# Patient Record
Sex: FEMALE | Race: WHITE | Employment: FULL TIME | URBAN - METROPOLITAN AREA
[De-identification: names, ages, dates, MRNs, and addresses within clinical notes are randomized per-mention and may not be internally consistent; named-entity substitution may affect disease eponyms.]

---

## 2019-04-01 ENCOUNTER — OFFICE VISIT (OUTPATIENT)
Dept: FAMILY MEDICINE CLINIC | Age: 24
End: 2019-04-01

## 2019-04-01 VITALS
DIASTOLIC BLOOD PRESSURE: 70 MMHG | WEIGHT: 116 LBS | RESPIRATION RATE: 16 BRPM | TEMPERATURE: 98.7 F | OXYGEN SATURATION: 97 % | BODY MASS INDEX: 22.78 KG/M2 | SYSTOLIC BLOOD PRESSURE: 108 MMHG | HEIGHT: 60 IN | HEART RATE: 77 BPM

## 2019-04-01 DIAGNOSIS — F98.8 ATTENTION DEFICIT DISORDER, UNSPECIFIED HYPERACTIVITY PRESENCE: ICD-10-CM

## 2019-04-01 DIAGNOSIS — R05.9 COUGH: ICD-10-CM

## 2019-04-01 DIAGNOSIS — Z86.19 H/O HERPES GENITALIS: ICD-10-CM

## 2019-04-01 DIAGNOSIS — N92.6 IRREGULAR PERIODS/MENSTRUAL CYCLES: ICD-10-CM

## 2019-04-01 DIAGNOSIS — J45.20 INTERMITTENT ASTHMA WITHOUT COMPLICATION, UNSPECIFIED ASTHMA SEVERITY: Primary | ICD-10-CM

## 2019-04-01 DIAGNOSIS — R09.82 POST-NASAL DRIP: ICD-10-CM

## 2019-04-01 RX ORDER — ALBUTEROL SULFATE 90 UG/1
2 AEROSOL, METERED RESPIRATORY (INHALATION)
Qty: 1 INHALER | Refills: 3 | Status: SHIPPED | OUTPATIENT
Start: 2019-04-01 | End: 2022-06-08 | Stop reason: SDUPTHER

## 2019-04-01 RX ORDER — FLUTICASONE PROPIONATE 110 UG/1
1 AEROSOL, METERED RESPIRATORY (INHALATION) EVERY 12 HOURS
Qty: 1 INHALER | Refills: 1 | Status: SHIPPED | OUTPATIENT
Start: 2019-04-01 | End: 2019-08-06 | Stop reason: ALTCHOICE

## 2019-04-01 RX ORDER — FLUTICASONE PROPIONATE 50 MCG
1 SPRAY, SUSPENSION (ML) NASAL 2 TIMES DAILY
Qty: 1 BOTTLE | Refills: 1 | Status: SHIPPED | OUTPATIENT
Start: 2019-04-01 | End: 2019-07-05

## 2019-04-01 RX ORDER — ACYCLOVIR 400 MG/1
TABLET ORAL
Refills: 0 | COMMUNITY
Start: 2019-02-22 | End: 2019-04-01 | Stop reason: SDUPTHER

## 2019-04-01 RX ORDER — NORGESTIMATE AND ETHINYL ESTRADIOL 7DAYSX3 LO
1 KIT ORAL DAILY
Qty: 1 DOSE PACK | Refills: 3 | Status: SHIPPED | OUTPATIENT
Start: 2019-04-01 | End: 2019-11-26 | Stop reason: SDUPTHER

## 2019-04-01 RX ORDER — ACYCLOVIR 400 MG/1
400 TABLET ORAL 3 TIMES DAILY
Qty: 15 TAB | Refills: 1 | Status: SHIPPED | OUTPATIENT
Start: 2019-04-01 | End: 2019-05-07 | Stop reason: SDUPTHER

## 2019-04-01 RX ORDER — MONTELUKAST SODIUM 10 MG/1
10 TABLET ORAL DAILY
Qty: 30 TAB | Refills: 1 | Status: SHIPPED | OUTPATIENT
Start: 2019-04-01 | End: 2019-08-06

## 2019-04-01 NOTE — PROGRESS NOTES
Chief Complaint Patient presents with [facility]  Asthma  Attention Deficit Disorder

## 2019-04-01 NOTE — PATIENT INSTRUCTIONS
Asthma Attack: Care Instructions Your Care Instructions During an asthma attack, the airways swell and narrow. This makes it hard to breathe. Severe asthma attacks can be life-threatening, but you can help prevent them by keeping your asthma under control and treating symptoms before they get bad. Symptoms include being short of breath, having chest tightness, coughing, and wheezing. Noting and treating these symptoms can also help you avoid future trips to the emergency room. The doctor has checked you carefully, but problems can develop later. If you notice any problems or new symptoms, get medical treatment right away. Follow-up care is a key part of your treatment and safety. Be sure to make and go to all appointments, and call your doctor if you are having problems. It's also a good idea to know your test results and keep a list of the medicines you take. How can you care for yourself at home? · Follow your asthma action plan to prevent and treat attacks. If you don't have an asthma action plan, work with your doctor to create one. · Take your asthma medicines exactly as prescribed. Talk to your doctor right away if you have any questions about how to take them. ? Use your quick-relief medicine when you have symptoms of an attack. Quick-relief medicine is usually an albuterol inhaler. Some people need to use quick-relief medicine before they exercise. ? Take your controller medicine every day, not just when you have symptoms. Controller medicine is usually an inhaled corticosteroid. The goal is to prevent problems before they occur. Don't use your controller medicine to treat an attack that has already started. It doesn't work fast enough to help. ? If your doctor prescribed corticosteroid pills to use during an attack, take them exactly as prescribed. It may take hours for the pills to work, but they may make the episode shorter and help you breathe better. ? Keep your quick-relief medicine with you at all times. · Talk to your doctor before using other medicines. Some medicines, such as aspirin, can cause asthma attacks in some people. · If you have a peak flow meter, use it to check how well you are breathing. This can help you predict when an asthma attack is going to occur. Then you can take medicine to prevent the asthma attack or make it less severe. · Do not smoke or allow others to smoke around you. Avoid smoky places. Smoking makes asthma worse. If you need help quitting, talk to your doctor about stop-smoking programs and medicines. These can increase your chances of quitting for good. · Learn what triggers an asthma attack for you, and avoid the triggers when you can. Common triggers include colds, smoke, air pollution, dust, pollen, mold, pets, cockroaches, stress, and cold air. · Avoid colds and the flu. Get a pneumococcal vaccine shot. If you have had one before, ask your doctor if you need a second dose. Get a flu vaccine every fall. If you must be around people with colds or the flu, wash your hands often. When should you call for help? Call 911 anytime you think you may need emergency care. For example, call if: 
  · You have severe trouble breathing.  
 Call your doctor now or seek immediate medical care if: 
  · Your symptoms do not get better after you have followed your asthma action plan.  
  · You have new or worse trouble breathing.  
  · Your coughing and wheezing get worse.  
  · You cough up dark brown or bloody mucus (sputum).  
  · You have a new or higher fever.  
 Watch closely for changes in your health, and be sure to contact your doctor if: 
  · You need to use quick-relief medicine on more than 2 days a week (unless it is just for exercise).  
  · You cough more deeply or more often, especially if you notice more mucus or a change in the color of your mucus.  
  · You are not getting better as expected. Where can you learn more? Go to http://jose-peter.info/. Enter D970 in the search box to learn more about \"Asthma Attack: Care Instructions. \" Current as of: September 5, 2018 Content Version: 11.9 © 9060-8316 [employer]. Care instructions adapted under license by [employer] (which disclaims liability or warranty for this information). If you have questions about a medical condition or this instruction, always ask your healthcare professional. [address] any warranty or liability for your use of this information. Human Papillomavirus (HPV): Care Instructions Your Care Instructions The human papillomavirus (HPV) is a very common virus. There are many types of HPV. Some types cause the common skin wart. Other types cause genital warts, which can be spread by sexual contact. Some types can increase the risk for cervical and anal cancer. Having one type of HPV does not lead to having another type. Many women who have HPV may not know that they are infected until it is found with a Pap test. Your doctor uses this test to look for abnormal cells on your cervix. If you have had an abnormal Pap test, your doctor may recommend that you have an HPV test. 
Like a Pap test, an HPV test is done on a sample of cells collected from the cervix. If the test finds that you have the types of HPV that might lead to cancer, your doctor may suggest more tests. This does not mean that you will develop cancer; it means that you may have an increased risk. Abnormal cell changes caused by HPV often go away on their own. If the changes do not go away, they can be treated. But because HPV can stay inside the body, the abnormal cervical cells sometimes come back. This is why it is important to follow up with your doctor and have regular Pap tests. Follow-up care is a key part of your treatment and safety.  Be sure to make and go to all appointments, and call your doctor if you are having problems. It's also a good idea to know your test results and keep a list of the medicines you take. How can you care for yourself at home? · If you are going to have a Pap or HPV test, do not douche or use tampons or vaginal creams in the 24 hours before the test. 
· Do not smoke. Smoking increases the risk for cervical problems and abnormal Pap tests. If you need help quitting, talk to your doctor about stop-smoking programs and medicines. These can increase your chances of quitting for good. · Use latex condoms every time you have sex. Use them from the beginning to the end of sexual contact. · Be sure to tell your sexual partner or partners that you have HPV. Even if you do not have symptoms, you can still pass HPV to others. · Having one sex partner (who does not have STIs and does not have sex with anyone else) is a good way to avoid STIs. When should you call for help? Watch closely for changes in your health, and be sure to contact your doctor if: 
  · You have vaginal pain during or after sex.  
  · You have vaginal bleeding when you are not in your menstrual period. Where can you learn more? Go to http://jose-peter.info/. Enter F690 in the search box to learn more about \"Human Papillomavirus (HPV): Care Instructions. \" Current as of: September 11, 2018 Content Version: 11.9 © 9556-9166 [employer]ensum. Care instructions adapted under license by [employer] (which disclaims liability or warranty for this information). If you have questions about a medical condition or this instruction, always ask your healthcare professional. [place] [address] any warranty or liability for your use of this information. Learning About the HPV Vaccine What is the HPV vaccine? The HPV (human papillomavirus) vaccine protects against HPV.  HPV is a common sexually transmitted infection (STI). There are many types of HPV. Some types of the virus can cause genital warts. Other types can cause cervical or oral cancer and some uncommon cancers, such as anal and vaginal cancer. The HPV vaccine is given as a series of shots. Who should get the vaccine? Experts recommend that girls and boys age 6 or 15 get the HPV vaccine, but the vaccine can be given from age 5 to 32. Children ages 5 to 15 get the vaccine in a series of two shots over 6 months. Children age 13 years and older get the vaccine as a three-dose series. For the vaccine to work best, all shots in the series must be given. What else do you need to know? The best time for a person to get the vaccine is before becoming sexually active. This is because the vaccine works best before there is any chance of infection with HPV. When the vaccine is given at this time, it can prevent almost all infection by the types of HPV the vaccine guards against. If the person has already been infected with the virus, the vaccine does not provide protection against the virus. Having the HPV vaccine does not change your need for Pap tests. Women who have had the HPV vaccine should follow the same Pap test schedule as women who have not had the vaccine. If you are a parent of a child who's getting the shot, talk to your child about HPV and the vaccine. It's a chance to teach your child about safer sex and STIs. Having your child get the shot doesn't mean you're giving your child permission to have sex. The vaccine can have side effects. Common side effects from the vaccine include headache, fever, and redness or swelling at the site of the shot. More serious side effects, such as fainting, are rare. · Take an over-the-counter pain medicine, such as acetaminophen (Tylenol) or ibuprofen (Advil, Motrin), to relieve common side effects. Read and follow all instructions on the label. · Put ice or a cold pack on the sore area for 10 to 20 minutes at a time. Put a thin cloth between the ice and your skin. Where can you learn more? Go to http://jose-peter.info/. Enter O361 in the search box to learn more about \"Learning About the HPV Vaccine. \" Current as of: June 17, 2018 Content Version: 11.9 © 2952-5339 [employer]. Care instructions adapted under license by [employer]Songs (which disclaims liability or warranty for this information). If you have questions about a medical condition or this instruction, always ask your healthcare professional. [place] [address] any warranty or liability for your use of this information. Combination Birth Control Pills: Care Instructions Your Care Instructions Combination birth control pills are used to prevent pregnancy. They give you a regular dose of the hormones estrogen and progestin. You take a hormone pill every day to prevent pregnancy. Birth control pills come in packs. The most common type has 3 weeks of hormone pills. Some packs have sugar pills (they do not contain any hormones) for the fourth week. During that fourth no-hormone week, you have your period. After the fourth week (28 days), you start a new pack. Some birth control pills are packaged in different ways. For example, some have hormone pills for the fourth week instead of sugar pills. Taking hormones for the entire month causes you to not have periods or to have fewer periods. Others are packaged so that you have a period every 3 months. Your doctor will tell you what type of pills you have. Follow-up care is a key part of your treatment and safety. Be sure to make and go to all appointments, and call your doctor if you are having problems. It's also a good idea to know your test results and keep a list of the medicines you take. How can you care for yourself at home? How do you take the pill? · Follow your doctor's instructions about when to start taking your pills. Use backup birth control, such as a condom, or don't have intercourse for 7 days after you start your pills. · Take your pills every day, at about the same time of day. To help yourself do this, try to take them when you do something else every day, such as brushing your teeth. What if you forget to take a pill? Always read the label for specific instructions, or call your doctor. Here are some basic guidelines: · If you miss 1 hormone pill, take it as soon as you remember. Ask your doctor if you may need to use a backup birth control method, such as a condom, or not have intercourse. · If you miss 2 or more hormone pills, take one as soon as you remember you forgot them. Then read the pill label or call your doctor about instructions on how to take your missed pills. Use a backup method of birth control or don't have intercourse for 7 days. Pregnancy is more likely if you miss more than 1 pill. · If you had intercourse, you can use emergency contraception, such as the morning-after pill (Plan B). You can use emergency contraception for up to 5 days after having had intercourse, but it works best if you take it right away. What else do you need to know? · The pill has side effects. ? You may have very light or skipped periods. ? You may have bleeding between periods (spotting). This usually decreases after 3 to 4 months. ? You may have mood changes, less interest in sex, or weight gain. · The pill may reduce acne, heavy bleeding and cramping, and symptoms of premenstrual syndrome. · Check with your doctor before you use any other medicines, including over-the-counter medicines, vitamins, herbal products, and supplements. Birth control hormones may not work as well to prevent pregnancy when combined with other medicines.  
· The pill doesn't protect against sexually transmitted infection (STIs), such as herpes or HIV/AIDS. If you're not sure whether your sex partner might have an STI, use a condom to protect against disease. When should you call for help? Call your doctor now or seek immediate medical care if: 
  · You have severe belly pain.  
  · You have signs of a blood clot, such as: 
? Pain in your calf, back of the knee, thigh, or groin. ? Redness and swelling in your leg or groin.  
  · You have blurred vision or other problems seeing.  
  · You have a severe headache.  
  · You have severe trouble breathing.  
 Watch closely for changes in your health, and be sure to contact your doctor if: 
  · You think you might be pregnant.  
  · You think you may be depressed.  
  · You think you may have been exposed to or have a sexually transmitted infection. Where can you learn more? Go to http://jose-peter.info/. Enter T931 in the search box to learn more about \"Combination Birth Control Pills: Care Instructions. \" Current as of: September 5, 2018 Content Version: 11.9 © 4707-0674 [employer]. Care instructions adapted under license by [employer] (which disclaims liability or warranty for this information). If you have questions about a medical condition or this instruction, always ask your healthcare professional. [address] any warranty or liability for your use of this information.

## 2019-04-01 NOTE — PROGRESS NOTES
HISTORY OF PRESENT ILLNESS Eliana Orozco is a 22 y.o. female. HPI; here as a new patient to get establish care. H/o asthma. Lately increase use of albuterol. Average use few times / week. Currently no need for albuterol since one week. On and off dry cough and wheezing. No sob. No chest discomfort. No chest pain or palpitation. Also h/o seasonal allergies. Currently using sudafed otc and it has been helping. Post nasal drip. No sore throat. Currently sitting comfortable. No audible wheezing. Able to talk in full sentence and not using any extra respiratory muscle. No fever. No cold symptoms. Had her flu shot. Does not smoke. Visit Vitals /70 (BP 1 Location: Left arm, BP Patient Position: Sitting) Pulse 77 Temp 98.7 °F (37.1 °C) (Oral) Resp 16 Ht 5' (1.524 m) Wt 116 lb (52.6 kg) SpO2 97% BMI 22.65 kg/m² Review medication list, vitals, problem list,allergies. Also irregular period since she started period. Currently not sexually active. Agree to use birth control pills. Not tried in the past.  
No family or personal history of blood clot. No smoking. No h/o depression or anxiety. No mood changes. H/o ADHD as a child. Was on medication but had anorexia from medication so parents took her off. Now she is school and work. She thinks she needs help to focus more and get more organize things. Also sometimes affecting her routine life. No depression or anxiety. Sleep is fair. No appetite or weight changes. No unusual fatigue. No known thyroid abnormality. H/o genital herpes. Taking acyclovir as needed. Asking for refill for flair. Currently asymptomatic. ROS: see HPI Physical Exam  
Constitutional: She is oriented to person, place, and time. No distress. Neck: No thyromegaly present. Cardiovascular: Normal rate, regular rhythm and normal heart sounds. Pulmonary/Chest: CTA Abdominal: Soft. Bowel sounds are normal. There is no tenderness. Musculoskeletal: She exhibits no edema. Lymphadenopathy:  
  She has no cervical adenopathy. Neurological: She is oriented to person, place, and time. Psychiatric: Her behavior is normal.  
 
 
ASSESSMENT and PLAN 
  ICD-10-CM ICD-9-CM 1. Intermittent asthma without complication, unspecified asthma severity: for now adding flovent, singulair and nasal spray. Advised to use albuterol only as needed. F/u next visit. J45.20 493.90 montelukast (SINGULAIR) 10 mg tablet  
   fluticasone propionate (FLONASE) 50 mcg/actuation nasal spray  
   fluticasone propionate (FLOVENT HFA) 110 mcg/actuation inhaler  
   albuterol (PROVENTIL HFA, VENTOLIN HFA, PROAIR HFA) 90 mcg/actuation inhaler 2. Post-nasal drip R09.82 784.91 montelukast (SINGULAIR) 10 mg tablet  
   fluticasone propionate (FLONASE) 50 mcg/actuation nasal spray 3. Cough R05 786.2 4. Attention deficit disorder, unspecified hyperactivity presence; for now sending her to psych for further evaluation. F98.8 314.00 REFERRAL TO PSYCHIATRY 5. Irregular periods/menstrual cycles; agree to start birth control pills. Discussed medication side effects. F/u next visit. Advised to keep menstrual diary. N92.6 626.4 norgestimate-ethinyl estradiol (ORTHO TRI-CYCLEN LO) 0.18/0.215/0.25 mg-25 mcg tab 6. H/O herpes genitalis: given refill of medication to take with flair. Z86.19 V12.09 acyclovir (ZOVIRAX) 400 mg tablet Pt understood and agree with the plan Review HM Had Tdap last year. HPV info given in AVS. Refused pneumococcal vaccine. Follow-up and Dispositions · Return in about 2 months (around 6/1/2019).

## 2019-05-07 DIAGNOSIS — Z86.19 H/O HERPES GENITALIS: ICD-10-CM

## 2019-05-07 RX ORDER — ACYCLOVIR 400 MG/1
TABLET ORAL
Qty: 15 TAB | Refills: 0 | Status: SHIPPED | OUTPATIENT
Start: 2019-05-07 | End: 2019-06-03 | Stop reason: SDUPTHER

## 2019-06-03 ENCOUNTER — OFFICE VISIT (OUTPATIENT)
Dept: FAMILY MEDICINE CLINIC | Age: 24
End: 2019-06-03

## 2019-06-03 VITALS
HEIGHT: 60 IN | SYSTOLIC BLOOD PRESSURE: 104 MMHG | HEART RATE: 77 BPM | TEMPERATURE: 97.7 F | WEIGHT: 114 LBS | DIASTOLIC BLOOD PRESSURE: 76 MMHG | RESPIRATION RATE: 16 BRPM | BODY MASS INDEX: 22.38 KG/M2 | OXYGEN SATURATION: 98 %

## 2019-06-03 DIAGNOSIS — R06.2 WHEEZING: ICD-10-CM

## 2019-06-03 DIAGNOSIS — Z86.19 H/O HERPES GENITALIS: ICD-10-CM

## 2019-06-03 DIAGNOSIS — J45.20 MILD INTERMITTENT ASTHMA, UNSPECIFIED WHETHER COMPLICATED: Primary | ICD-10-CM

## 2019-06-03 RX ORDER — ACYCLOVIR 400 MG/1
400 TABLET ORAL 3 TIMES DAILY
Qty: 15 TAB | Refills: 2 | Status: SHIPPED | OUTPATIENT
Start: 2019-06-03 | End: 2019-09-27 | Stop reason: SDUPTHER

## 2019-06-03 NOTE — PROGRESS NOTES
Chief Complaint   Patient presents with    Asthma    Attention Deficit Disorder    Irregular Menses     1. Have you been to the ER, urgent care clinic since your last visit? Hospitalized since your last visit?2 weeks ago to [facility] for [facility]TI    2. Have you seen or consulted any other health care providers outside of the [address] [place] [address] since your last visit? Include any pap smears or colon screening.  No

## 2019-06-03 NOTE — PROGRESS NOTES
HISTORY OF PRESENT ILLNESS  Emery Elizabeth is a 22 y.o. female. HPI: here for follow up. Last visit added Flovent to albuterol as she was using albuterol almost daily due to chest congestion and wheezing. She has h/o asthma. Still not much improvement. Said still need to use 2-3 times albuterol. Compliant with using Flovent. Also taking Singulair. Denies any cold symptoms or post nasal drip. Currently sitting comfortable. No wheezing or chest congestion. asymptomatic during office visit. No chest pain or sob. No palpitation. No diaphoresis. No headache or dizziness. No urinary or bowel complains. No unusual fatigue. No appetite or weight changes. Visit Vitals  /76 (BP 1 Location: Left arm, BP Patient Position: Sitting)   Pulse 77   Temp 97.7 °F (36.5 °C) (Oral)   Resp 16   Ht 5' (1.524 m)   Wt 114 lb (51.7 kg)   SpO2 98%   BMI 22.26 kg/m²     Review medication list, vitals, problem list,allergies. Asking refill for antiviral. On and off outbreak. ROS: see HPI     Physical Exam   Constitutional: She is oriented to person, place, and time. No distress. Cardiovascular: Normal heart sounds. Pulmonary/Chest: No respiratory distress. She has no wheezes. Neurological: She is oriented to person, place, and time. ASSESSMENT and PLAN    ICD-10-CM ICD-9-CM    1. Mild intermittent asthma, unspecified whether complicated: for now continue flovent and as needed albuterol. Sending to pulmonary for further evaluation and treatment. Mean time continue singulair. J45.20 493.90 REFERRAL TO PULMONARY DISEASE   2. H/O herpes genitalis Z86.19 V12.09 acyclovir (ZOVIRAX) 400 mg tablet   3. Wheezing R06.2 786.07    Pt understood and agree with the plan   Review    Follow-up and Dispositions    · Return in about 3 months (around 9/3/2019).

## 2019-06-03 NOTE — PATIENT INSTRUCTIONS
Asthma Attack: Care Instructions  Your Care Instructions    During an asthma attack, the airways swell and narrow. This makes it hard to breathe. Severe asthma attacks can be life-threatening, but you can help prevent them by keeping your asthma under control and treating symptoms before they get bad. Symptoms include being short of breath, having chest tightness, coughing, and wheezing. Noting and treating these symptoms can also help you avoid future trips to the emergency room. The doctor has checked you carefully, but problems can develop later. If you notice any problems or new symptoms, get medical treatment right away. Follow-up care is a key part of your treatment and safety. Be sure to make and go to all appointments, and call your doctor if you are having problems. It's also a good idea to know your test results and keep a list of the medicines you take. How can you care for yourself at home? · Follow your asthma action plan to prevent and treat attacks. If you don't have an asthma action plan, work with your doctor to create one. · Take your asthma medicines exactly as prescribed. Talk to your doctor right away if you have any questions about how to take them. ? Use your quick-relief medicine when you have symptoms of an attack. Quick-relief medicine is usually an albuterol inhaler. Some people need to use quick-relief medicine before they exercise. ? Take your controller medicine every day, not just when you have symptoms. Controller medicine is usually an inhaled corticosteroid. The goal is to prevent problems before they occur. Don't use your controller medicine to treat an attack that has already started. It doesn't work fast enough to help. ? If your doctor prescribed corticosteroid pills to use during an attack, take them exactly as prescribed. It may take hours for the pills to work, but they may make the episode shorter and help you breathe better. ?  Keep your quick-relief medicine with you at all times. · Talk to your doctor before using other medicines. Some medicines, such as aspirin, can cause asthma attacks in some people. · If you have a peak flow meter, use it to check how well you are breathing. This can help you predict when an asthma attack is going to occur. Then you can take medicine to prevent the asthma attack or make it less severe. · Do not smoke or allow others to smoke around you. Avoid smoky places. Smoking makes asthma worse. If you need help quitting, talk to your doctor about stop-smoking programs and medicines. These can increase your chances of quitting for good. · Learn what triggers an asthma attack for you, and avoid the triggers when you can. Common triggers include colds, smoke, air pollution, dust, pollen, mold, pets, cockroaches, stress, and cold air. · Avoid colds and the flu. Get a pneumococcal vaccine shot. If you have had one before, ask your doctor if you need a second dose. Get a flu vaccine every fall. If you must be around people with colds or the flu, wash your hands often. When should you call for help? Call 911 anytime you think you may need emergency care. For example, call if:    · You have severe trouble breathing.    Call your doctor now or seek immediate medical care if:    · Your symptoms do not get better after you have followed your asthma action plan.     · You have new or worse trouble breathing.     · Your coughing and wheezing get worse.     · You cough up dark brown or bloody mucus (sputum).     · You have a new or higher fever.    Watch closely for changes in your health, and be sure to contact your doctor if:    · You need to use quick-relief medicine on more than 2 days a week (unless it is just for exercise).     · You cough more deeply or more often, especially if you notice more mucus or a change in the color of your mucus.     · You are not getting better as expected. Where can you learn more?   Go to http://tyrel.info/. Enter C262 in the search box to learn more about \"Asthma Attack: Care Instructions. \"  Current as of: September 5, 2018  Content Version: 11.9  © 9814-5527 [employer]. Care instructions adapted under license by [employer] (which disclaims liability or warranty for this information). If you have questions about a medical condition or this instruction, always ask your healthcare professional. [address] any warranty or liability for your use of this information. Learning About Attention Deficit Hyperactivity Disorder (ADHD) in Adults  What is ADHD? Attention deficit hyperactivity disorder (ADHD) is a condition in which people have a hard time paying attention. Adults with ADHD also may be more active than normal. They tend to act without thinking. ADHD may make it harder for them to focus, get organized, and finish tasks. ADHD most often starts in childhood and lasts into adulthood. Many adults don't know that they have ADHD until their children are diagnosed. Then they begin to see their own symptoms. Doctors don't know what causes ADHD. But it tends to run in families. What are the symptoms? The most common types of ADHD symptoms in adults are attention problems and hyperactivity. Attention problems  Adults with ADHD often find it hard to:  · Finish tasks that don't interest them or aren't easy. But they may become obsessed with activities that they find interesting and enjoy. · Keep relationships. · Focus their attention on conversations, reading materials, or jobs. They may change jobs a lot. · Remember things. They may misplace or lose things. · Pay attention. They are easily distracted. They find it hard to focus on one task. · Think before they act. They may make quick decisions. They may act before they think about the effect of their actions. Hyperactivity  Adults with ADHD may:  · Fidget. They may swing their legs, shift in their seats, or tap their fingers. · Move around a lot. They may feel \"revved up\" or on the go. They may not be able to slow down until they are very tired. · Find it hard to relax. They may feel restless and find it hard to do quiet things like read or watch TV. How does ADHD affect daily life? ADHD in adults may affect:  · Job performance. They may find it hard to organize their work, manage their time, and focus on one task at a time. They may forget, misplace, or lose things. They may quit their jobs out of boredom. · Relationships. Adults with ADHD may find it hard to focus their attention on conversations. It is hard for them to \"read\" the behavior and moods of others and express their own feelings. · Temper. They may get easily frustrated. This often can make it harder for them to deal with stress. These adults may overreact and have a short, quick temper. · The ability to solve problems. Adults who have a hard time waiting for things they want may act before they think about the effect of their actions. They may take part in risky behaviors. These include unprotected sex, unsafe driving, alcohol and drug use, or unwise business ventures. How is ADHD treated?   ADHD can be treated with medicines, behavior training, or counseling. Or it may be a combination of these treatments. Medicines   Stimulant medicines are most often used to treat ADHD. These may include:    · Amphetamines (such as Adderall and Dexedrine).     · Methylphenidate (such as Concerta, Daytrana, Focalin, Metadate, and Ritalin).    Other medicines that may be used are:    · Atomoxetine, such as Strattera, a nonstimulant medicine for ADHD.     · Antihypertensives. These include clonidine (such as Catapres) and guanfacine (such as Tenex).   · Antidepressants, which include bupropion (Wellbutrin).   [employer] training can help adults with ADHD learn how to:    · Get organized.  A daily organizer or planner can help these adults organize their daily tasks. They can write down appointments and other things they need to remember.     · Decrease distractions. They can set up their work or home environment so that there are fewer things that will distract them. They may find using headphones or a \"white noise\" machine helpful. College students can arrange a quiet living situation. They may need a single dorm room.     · Work on relationships. Social skills training can help adults with ADHD relate to family, friends, and coworkers. Couples counseling or family therapy can also help improve relationships.   Dora [employer] is not meant to treat inattention, hyperactivity, or impulsiveness. But it can help with some of the problems that go along with ADHD. These include not getting along well with others and having problems following rules. Where can you learn more? Go to http://jose-peter.info/. Enter H756 in the search box to learn more about \"Learning About Attention Deficit Hyperactivity Disorder (ADHD) in Adults. \"  Current as of: September 11, 2018  Content Version: 11.9  © 3869-1427 [employer]. Care instructions adapted under license by [employer] (which disclaims liability or warranty for this information). If you have questions about a medical condition or this instruction, always ask your healthcare professional. [address] any warranty or liability for your use of this information.

## 2019-07-05 ENCOUNTER — OFFICE VISIT (OUTPATIENT)
Dept: FAMILY MEDICINE CLINIC | Age: 24
End: 2019-07-05

## 2019-07-05 VITALS
OXYGEN SATURATION: 98 % | HEIGHT: 60 IN | HEART RATE: 75 BPM | TEMPERATURE: 98.5 F | DIASTOLIC BLOOD PRESSURE: 66 MMHG | SYSTOLIC BLOOD PRESSURE: 102 MMHG | BODY MASS INDEX: 23.16 KG/M2 | RESPIRATION RATE: 16 BRPM | WEIGHT: 118 LBS

## 2019-07-05 DIAGNOSIS — J18.9 COMMUNITY ACQUIRED PNEUMONIA, UNSPECIFIED LATERALITY: Primary | ICD-10-CM

## 2019-07-05 RX ORDER — DOXYCYCLINE 100 MG/1
100 CAPSULE ORAL 2 TIMES DAILY
COMMUNITY
Start: 2019-06-28 | End: 2019-07-24 | Stop reason: ALTCHOICE

## 2019-07-05 NOTE — PROGRESS NOTES
1. Have you been to the ER, urgent care clinic since your last visit? Hospitalized since your last visit? Yes 06- [facility] [address]     2. Have you seen or consulted any other health care providers outside of the [address] [place] [address] since your last visit? Include any pap smears or colon screening.  No     [facility] notes requested on 07-

## 2019-07-05 NOTE — PROGRESS NOTES
Patient: Jessika Morales MRN: [id number]  SSN: [id number]    YOB: 1995  Age: 22 y.o. Sex: female      Date of Service: 7/5/2019   Provider: GREER Jama   Office Location:   [address] [place] [address]  Office Phone: [phone]  Office Fax: [phone]        REASON FOR VISIT:   Chief Complaint   Patient presents with    Pneumonia     [facility]        VITALS:   Visit Vitals  Pulse 75   Temp 98.5 °F (36.9 °C) (Oral)   Resp 16   Ht 5' (1.524 m)   Wt 118 lb (53.5 kg)   SpO2 98%   BMI 23.05 kg/m²       MEDICATIONS:   Current Outpatient Medications   Medication Sig Dispense Refill    doxycycline (VIBRAMYCIN) 100 mg capsule Take 100 mg by mouth two (2) times a day.  acyclovir (ZOVIRAX) 400 mg tablet Take 1 Tab by mouth three (3) times daily. 15 Tab 2    montelukast (SINGULAIR) 10 mg tablet Take 1 Tab by mouth daily. 30 Tab 1    fluticasone propionate (FLOVENT HFA) 110 mcg/actuation inhaler Take 1 Puff by inhalation every twelve (12) hours. 1 Inhaler 1    albuterol (PROVENTIL HFA, VENTOLIN HFA, PROAIR HFA) 90 mcg/actuation inhaler Take 2 Puffs by inhalation every six (6) hours as needed for Wheezing or Shortness of Breath. 1 Inhaler 3    norgestimate-ethinyl estradiol (ORTHO TRI-CYCLEN LO) 0.18/0.215/0.25 mg-25 mcg tab Take 1 Tab by mouth daily. 1 Dose Pack 3    ibuprofen (MOTRIN) 200 mg tablet Take  by mouth every six (6) hours as needed for Pain. ALLERGIES:   Allergies   Allergen Reactions    Macrobid [Nitrofurantoin Monohyd/M-Cryst] Itching        ACTIVE MEDICAL PROBLEMS:  Patient Active Problem List   Diagnosis Code    Asthma J45.909    Irregular periods/menstrual cycles N92.6    H/O herpes genitalis Z86.19    Attention deficit disorder F98.8          HISTORY OF PRESENT ILLNESS:   Jessika Morales is a 22 y.o. female who presents to the office for acute care.  PCP:  Winston Waller.    Here today in follow up of recent [facility]. Patient reports that she was initially seen at the [address] location on 6/5 with complaints of fever and productive cough. She was found to have an elevated WBC count and possible early b/l pneumonias on CXR. She was treated with a dose of IM Rocephin and a Z pack, and states that symptoms did resolve within a week. She was feeling well until last Friday 6/28, when she suddenly developed a cough and fever again. She then went to the [facility] on [address] [place] [address],[address], had more labs and another CXR, and was told that her pneumonia had not fully resolved. She was started on doxycycline 100 mg BID x 10 days, and still has a few more doses of that left to take. Overall, she is feeling much better. She has not had a fever since 6/28. Still with some lingering cough, but overall improving. Denies chest pains or SOB. Of note, patient does had a history of asthma which has not been optimally controlled as of late. She has an initial consultation with pulmonology later this month. REVIEW OF SYSTEMS:  Review of Systems   Constitutional: Negative for chills, fever and malaise/fatigue. HENT: Negative for congestion, ear pain and sore throat. Respiratory: Positive for cough and sputum production. Negative for hemoptysis, shortness of breath and wheezing. Cardiovascular: Negative for chest pain, palpitations and leg swelling. Gastrointestinal: Negative for diarrhea, nausea and vomiting. PHYSICAL EXAMINATION:  Physical Exam   Constitutional: She is oriented to person, place, and time and well-developed, well-nourished, and in no distress. HENT:   Head: Normocephalic and atraumatic.    Right Ear: External ear and ear canal normal.   Left Ear: External ear and ear canal normal.   Nose: Nose normal.   Mouth/Throat: Uvula is midline, oropharynx is clear and moist and mucous membranes are normal. cerumen impaction R ear   Eyes: Pupils are equal, round, and reactive to light. Conjunctivae are normal. Right eye exhibits no discharge. Left eye exhibits no discharge. Neck: Neck supple. Cardiovascular: Normal rate, regular rhythm and normal heart sounds. Exam reveals no gallop and no friction rub. No murmur heard. Pulmonary/Chest: Effort normal and breath sounds normal. No stridor. She has no wheezes. She has no rales. Lymphadenopathy:     She has no cervical adenopathy. Neurological: She is alert and oriented to person, place, and time. Gait normal.   Skin: Skin is warm and dry. No rash noted. Psychiatric: Mood, memory and affect normal.        RESULTS:  No results found for this visit on 07/05/19. ASSESSMENT/PLAN:  Diagnoses and all orders for this visit:    1. Community acquired pneumonia, unspecified laterality  - Reviewed available documentation from 6/5 [facility] visit. Requested records from 6/28 visit. - We discussed that pneumonia can be persistent radiographically for several weeks following initial illness, especially in patients with chronic lung disease such as asthma. Do not see any utility in repeating imaging today as lung exam was normal, though I do suspect this pneumonia should be monitored to resolution.   - For now, complete full course of doxycycline and keep pulmonology follow up as scheduled later this month  - Will reach out to patient if I have any further recommendations once [facility] records from 6/28 have been received and reviewed       All questions answered. Patient expresses understanding and agrees with the plan as detailed above. Follow-up and Dispositions    · Return in about 2 months (around 9/3/2019) for routine care.           PATIENT CARE TEAM:   Patient Care Team:  Bina Grimes MD as PCP - General (Family Practice)       GREER Lewis   July 5, 2019   9:48 AM

## 2019-07-05 NOTE — PATIENT INSTRUCTIONS
Pneumonia: Care Instructions  Your Care Instructions    Pneumonia is an infection of the lungs. Most cases are caused by infections from bacteria or viruses. Pneumonia may be mild or very severe. If it is caused by bacteria, you will be treated with antibiotics. It may take a few weeks to a few months to recover fully from pneumonia, depending on how sick you were and whether your overall health is good. Follow-up care is a key part of your treatment and safety. Be sure to make and go to all appointments, and call your doctor if you are having problems. It's also a good idea to know your test results and keep a list of the medicines you take. How can you care for yourself at home? · Take your antibiotics exactly as directed. Do not stop taking the medicine just because you are feeling better. You need to take the full course of antibiotics. · Take your medicines exactly as prescribed. Call your doctor if you think you are having a problem with your medicine. · Get plenty of rest and sleep. You may feel weak and tired for a while, but your energy level will improve with time. · To prevent dehydration, drink plenty of fluids, enough so that your urine is light yellow or clear like water. Choose water and other caffeine-free clear liquids until you feel better. If you have kidney, heart, or liver disease and have to limit fluids, talk with your doctor before you increase the amount of fluids you drink. · Take care of your cough so you can rest. A cough that brings up mucus from your lungs is common with pneumonia. It is one way your body gets rid of the infection. But if coughing keeps you from resting or causes severe fatigue and chest-wall pain, talk to your doctor. He or she may suggest that you take a medicine to reduce the cough. · Use a vaporizer or humidifier to add moisture to your bedroom. Follow the directions for cleaning the machine. · Do not smoke or allow others to smoke around you.  Smoke will make your cough last longer. If you need help quitting, talk to your doctor about stop-smoking programs and medicines. These can increase your chances of quitting for good. · Take an over-the-counter pain medicine, such as acetaminophen (Tylenol), ibuprofen (Advil, Motrin), or naproxen (Aleve). Read and follow all instructions on the label. · Do not take two or more pain medicines at the same time unless the doctor told you to. Many pain medicines have acetaminophen, which is Tylenol. Too much acetaminophen (Tylenol) can be harmful. · If you were given a spirometer to measure how well your lungs are working, use it as instructed. This can help your doctor tell how your recovery is going. · To prevent pneumonia in the future, talk to your doctor about getting a flu vaccine (once a year) and a pneumococcal vaccine (one time only for most people). When should you call for help? Call 911 anytime you think you may need emergency care. For example, call if:    · You have severe trouble breathing.    Call your doctor now or seek immediate medical care if:    · You cough up dark brown or bloody mucus (sputum).     · You have new or worse trouble breathing.     · You are dizzy or lightheaded, or you feel like you may faint.    Watch closely for changes in your health, and be sure to contact your doctor if:    · You have a new or higher fever.     · You are coughing more deeply or more often.     · You are not getting better after 2 days (48 hours).     · You do not get better as expected. Where can you learn more? Go to http://jose-peter.info/. Enter [id number] in the search box to learn more about \"Pneumonia: Care Instructions. \"  Current as of: September 5, 2018  Content Version: 11.9  © 9184-9729 [employer]. Care instructions adapted under license by [employer] (which disclaims liability or warranty for this information).  If you have questions about a medical condition or this instruction, always ask your healthcare professional. [place] [address] any warranty or liability for your use of this information.

## 2019-07-24 ENCOUNTER — OFFICE VISIT (OUTPATIENT)
Dept: PULMONOLOGY | Age: 24
End: 2019-07-24

## 2019-07-24 VITALS
DIASTOLIC BLOOD PRESSURE: 70 MMHG | RESPIRATION RATE: 19 BRPM | SYSTOLIC BLOOD PRESSURE: 108 MMHG | TEMPERATURE: 98.2 F | WEIGHT: 119 LBS | HEIGHT: 60 IN | BODY MASS INDEX: 23.36 KG/M2 | HEART RATE: 80 BPM | OXYGEN SATURATION: 99 %

## 2019-07-24 DIAGNOSIS — J30.89 NON-SEASONAL ALLERGIC RHINITIS DUE TO OTHER ALLERGIC TRIGGER: ICD-10-CM

## 2019-07-24 DIAGNOSIS — J18.9 PNEUMONIA DUE TO INFECTIOUS ORGANISM, UNSPECIFIED LATERALITY, UNSPECIFIED PART OF LUNG: ICD-10-CM

## 2019-07-24 DIAGNOSIS — J45.50 POORLY CONTROLLED SEVERE PERSISTENT ASTHMA WITHOUT COMPLICATION: Primary | ICD-10-CM

## 2019-07-24 RX ORDER — FLUTICASONE PROPIONATE 50 MCG
SPRAY, SUSPENSION (ML) NASAL
Refills: 1 | COMMUNITY
Start: 2019-05-07 | End: 2019-08-06 | Stop reason: SDUPTHER

## 2019-07-24 RX ORDER — BUDESONIDE AND FORMOTEROL FUMARATE DIHYDRATE 160; 4.5 UG/1; UG/1
2 AEROSOL RESPIRATORY (INHALATION) 2 TIMES DAILY
Qty: 1 INHALER | Refills: 0 | Status: SHIPPED | COMMUNITY
Start: 2019-07-24 | End: 2020-09-24 | Stop reason: SDUPTHER

## 2019-07-24 RX ORDER — DEXTROAMPHETAMINE SACCHARATE, AMPHETAMINE ASPARTATE, DEXTROAMPHETAMINE SULFATE AND AMPHETAMINE SULFATE 5; 5; 5; 5 MG/1; MG/1; MG/1; MG/1
TABLET ORAL
Refills: 0 | COMMUNITY
Start: 2019-07-20 | End: 2019-09-13

## 2019-07-24 RX ORDER — BUDESONIDE AND FORMOTEROL FUMARATE DIHYDRATE 160; 4.5 UG/1; UG/1
2 AEROSOL RESPIRATORY (INHALATION) 2 TIMES DAILY
Qty: 3 INHALER | Refills: 3 | Status: SHIPPED | OUTPATIENT
Start: 2019-07-24 | End: 2019-08-06 | Stop reason: SDUPTHER

## 2019-07-24 NOTE — PROGRESS NOTES
Verbal Order with read back per Titi Talley MD  For PFT smart panel. AMB POC PFT complete w/ bronchodilator    Dr. Dheeraj Vail MD will co-sign the orders.

## 2019-07-24 NOTE — PROGRESS NOTES
Olu Dingn presents today for   Chief Complaint   Patient presents with   [facility] Referral / Consult     referred by Dr. Eliana Morle for Asthma    Asthma       Is someone accompanying this pt? No    Is the patient using any DME equipment during OV? No    -DME Company N/A    Depression Screening:  3 most recent PHQ Screens 7/24/2019   Little interest or pleasure in doing things Not at all   Feeling down, depressed, irritable, or hopeless Not at all   Total Score PHQ 2 0       Learning Assessment:  Learning Assessment 4/1/2019   PRIMARY LEARNER Patient   HIGHEST LEVEL OF EDUCATION - PRIMARY LEARNER  2 YEARS OF COLLEGE   BARRIERS PRIMARY LEARNER NONE   CO-LEARNER CAREGIVER No   PRIMARY LANGUAGE ENGLISH   LEARNER PREFERENCE PRIMARY VIDEOS   ANSWERED BY patient   RELATIONSHIP SELF       Abuse Screening:  Abuse Screening Questionnaire 4/1/2019   Do you ever feel afraid of your partner? N   Are you in a relationship with someone who physically or mentally threatens you? N   Is it safe for you to go home? Y       Fall Risk  No flowsheet data found. Coordination of Care:  1. Have you been to the ER, urgent care clinic since your last visit? Hospitalized since your last visit? No    2. Have you seen or consulted any other health care providers outside of the [address] [place] [address] since your last visit? Include any pap smears or colon screening.  No

## 2019-07-24 NOTE — PROGRESS NOTES
[address] [facility], [address] [facility]  Pulmonary, Critical Care, and Sleep Medicine    Pulmonary Office Initial Consultation  Name: Mandi Hernandez 22 y.o. female  MRN: [id number]  : 1995  Service Date: 19    Referring Provider: Asif Mcgowan 90  [address] [facility], [address] [place], [place] [address]  Chief Complaint:   Chief Complaint   Patient presents with   [facility] Referral / Consult     referred by Dr. Willodean Gaucher for Asthma    Asthma       History of Present Illness:  Mandi Hernandez is a 22 y.o. female, who presents to Pulmonary clinic referred for management of asthma. Patient reports that she was diagnosed as a baby. Pt reports she had a \"collapsed lung\" when she was born. She has had sx ever since she was a child. Pt reports she was on Advair, albuterol, and singulair in the past.   Pt reports she was given Singulair by her PCP and since it wasn't doing anything, she was told to discontinue it -- used it for 2 months. Patient recalls using Flovent for a few weeks and then discontinued it for no effect. Associated symptoms: wheezing, chest tightness, dyspnea, nonproductive cough - multiple paroxysms  Triggers: dust, pollen/grass, perfumes, secondhand smoke, dogs, mold  Nocturnal awakenings: 3-4x/month  Exacerbations requiring prednisone/hospitalization: 1  Pets: 2 dogs  House: new, carpet/hardwood, natural gas heat  Occupational exposures: paramedic student, manager at [employer], no cold/silica/asbestos exposure  Smoking Hx: lifelong nonsmoker  Severe allergies, seasonally, worse in spring, and fall. Patient has runny nose, postnasal drip, itchy watery eyes. Patient reports only alleviating factor is PRN albuterol, as well as rest.  No other modifying factors. Using PRN albuterol a few times a week. Of note, patient was treated for possible pneumonia at urgent care a few months ago. Patient reports she had chest x-ray done there, given a course of antibiotics, followed up with repeat chest x-ray, which showed persistent infiltrate, so patient was given addition antibiotics. Denies angina, fevers, chills, night sweats, sputum, hemoptysis, voice hoarseness, night sweats, weight loss, orthopnea, LE swelling. Past Medical Hx: I have personally reviewed medical hx  Past Medical History:   Diagnosis Date    ADD (attention deficit disorder)     Asthma     Headache      Past Surgical Hx: I have personally reviewed surgical hx  Past Surgical History:   Procedure Laterality Date    HX HEENT Right 2010    \"skin graft in ear drum\" tympanoplasty     Family Hx: I have personally reviewed the family hx. No family hx of hereditary lung disease with immediate family. Family History   Problem Relation Age of Onset    Psychiatric Disorder Mother     Hypertension Maternal Grandmother     Glaucoma Maternal Grandmother     Cancer Maternal Grandfather     Cancer Paternal Grandmother     Parkinson's Disease Paternal Grandfather        Social Hx: I have personally reviewed the social hx.   Social History     Socioeconomic History    Marital status: SINGLE     Spouse name: Not on file    Number of children: Not on file    Years of education: Not on file    Highest education level: Not on file   Occupational History    Not on file   Social Needs    Financial resource strain: Not on file    Food insecurity:     Worry: Not on file     Inability: Not on file    Transportation needs:     Medical: Not on file     Non-medical: Not on file   Tobacco Use    Smoking status: Never Smoker    Smokeless tobacco: Never Used   Substance and Sexual Activity    Alcohol use: Yes     Comment: rare    Drug use: No    Sexual activity: Not Currently     Partners: Male   Lifestyle    Physical activity:     Days per week: Not on file     Minutes per session: Not on file    Stress: Not on file   Relationships    Social connections:     Talks on phone: Not on file     Gets together: Not on file     Attends Episcopalian service: Not on file     Active member of club or organization: Not on file     Attends meetings of clubs or organizations: Not on file     Relationship status: Not on file    Intimate partner violence:     Fear of current or ex partner: Not on file     Emotionally abused: Not on file     Physically abused: Not on file     Forced sexual activity: Not on file   Other Topics Concern    Not on file   Social History Narrative    Not on file   Occupational Hx: EMT student, prior [employer] , no occupational exposure to coal, silica, or asbestos      Allergies: I have reviewed the allergy hx  Allergies   Allergen Reactions    Macrobid [Nitrofurantoin Monohyd/M-Cryst] Itching       Medications:  I have reviewed the patient's medications  Prior to Admission medications    Medication Sig Start Date End Date Taking? Authorizing Provider   dextroamphetamine-amphetamine (ADDERALL) 20 mg tablet TAKE 1 TABLET BY MOUTH ONCE DAILY FOR ADHD 7/20/19  Yes Provider, Historical   fluticasone propionate (FLONASE) 50 mcg/actuation nasal spray USE 1 SPRAY IN EACH NOSTRIL BID 5/7/19  Yes Provider, Historical   acyclovir (ZOVIRAX) 400 mg tablet Take 1 Tab by mouth three (3) times daily. 6/3/19  Yes Marta Srinivasan MD   albuterol (PROVENTIL HFA, VENTOLIN HFA, PROAIR HFA) 90 mcg/actuation inhaler Take 2 Puffs by inhalation every six (6) hours as needed for Wheezing or Shortness of Breath. 4/1/19  Yes Marta Srinivasan MD   ibuprofen (MOTRIN) 200 mg tablet Take  by mouth every six (6) hours as needed for Pain. Yes Provider, Historical   montelukast (SINGULAIR) 10 mg tablet Take 1 Tab by mouth daily. 4/1/19   Marta Srinivasan MD   fluticasone propionate (FLOVENT HFA) 110 mcg/actuation inhaler Take 1 Puff by inhalation every twelve (12) hours.  4/1/19   Marta Srinivasan MD   norgestimate-ethinyl estradiol (ORTHO TRI-CYCLEN LO) 0. 18/0.215/0.25 mg-25 mcg tab Take 1 Tab by mouth daily. 4/1/19   Heidy Jorge MD       Immunizations:  I have reviewed the patient's immunizations  Immunization History   Administered Date(s) Administered    HPV (9-valent) 12/12/2017, 02/12/2018    HPV (Quad) 06/14/2018    Influenza Vaccine (Quad) PF 08/22/2018       Review of Systems:  A complete review of systems was performed as stated in the HPI, all others are negative. Objective:    Physical Exam:  /70 (BP 1 Location: Left arm, BP Patient Position: At rest)   Pulse 80   Temp 98.2 °F (36.8 °C) (Oral)   Resp 19   Ht 5' (1.524 m)   Wt 54 kg (119 lb)   LMP 02/24/2019   SpO2 99%   BMI 23.24 kg/m²   Vitals were personally reviewed  Gen: no acute distress, pleasant and cooperative, sitting up in chair, able to climb to exam table w/o difficulty  HEENT: normocephalic/atraumatic, PERRLA, EOMI, no scleral icterus, nasal turbinates have no erythema, no nasal polyps, no oral lesions, good dentition, Mallampati I  Neck: supple, trachea midline, no JVD, no cervical and supraclavicular adenopathy  Chest: no lesions, with even rise and fall, no pectus excavatum or flail chest  CVS: regular rate rhythm, S1/S2, no murmurs/rubs/gallops  Lungs: good air entry B/L, CTABL, no wheezes/rales/rhonchi  Back: no kyphosis or scoliosis  Abdomen: soft, nontender, bowel sounds present, no hepatosplenomegaly  Ext: no pitting edema B/L, no peripheral cyanosis or clubbing  Neuro: grossly normal, AAOx3, normal strength and coordination grossly, no focal deficits  Skin: no rashes, erythema, lesions  Psych: normal memory, thought content, and processing    Labs: I have reviewed the patient's available labs -- none on file    Outside records reviewed as follows --patient last seen by PCP on 6/3/2019, reported to be using albuterol 2-3 times daily, compliant with Flovent, Singulair, remains symptomatic, will refer to pulmonary.     Imaging:  I have personally reviewed patient's imaging --none on file    PFTs:  I have reviewed the patient's PFTs from today--spirometry is normal, but shows a reduced FEV1 at 75%. No BD response. TTE:  I have reviewed the patient's TTE results  No results found for this or any previous visit. Assessment and Plan:  22 y.o. female with:    Impression:  1. Poorly controlled, severe persistent asthma: Diagnosed in childhood, severity of asthma based off of patient's frequency of nocturnal awakenings. 2.  Allergic rhinitis  3. History of pneumonia: Patient treated earlier this year, at urgent care, diagnosed by chest x-ray, did not clear with repeat, so patient given additional antibiotics. 4.  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 Plan:  -Chest x-ray obtained in clinic today, personally interpreted, shows no residual infiltrate. Does show right middle lobe lesion, consistent with chronic scarring, likely present since birth. No further work-up required  -Obtain CBC with differential, total IgE, regional allergy profile  -Discontinue Flovent and start Symbicort 160/4.5 MCG 2 puffs twice daily, with spacer. Advised patient to rinse mouth thoroughly after each use. Sample provided in clinic today.  -Continue singular 10 mg nightly  -Start Flonase 2 sprays to each nostril once daily  -Continue albuterol HFA 1-2puffs q4-6h PRN. Counseled patient that this is their rescue inhaler. Also counseled patient regarding premedication 15-30m prior to exercise or exposure to very cold air  -Counseled patient on proper inhaler technique  -Counseled patient to avoid potential triggers of asthma. Advised to wear a mask when in a niki environment and while mowing the lawn or in high pollen area.    Advised regarding home remediation including to wash/steam clean rugs/carpet, drapes, bedding on a frequent basis and consider allergy covers for pillows and bedding.  -Immunizations reviewed, influenza vaccination up-to-date  -Advised patient remain active     Follow-up and Dispositions    · Return in about 6 weeks (around 9/4/2019).        Orders Placed This Encounter    AMB POC SPIROMETRY W/BRONCHODILATOR    XR CHEST PA LAT    CBC WITH AUTOMATED DIFF    IMMUNOGLOBULIN E, QT    ALLERGEN PROFILE, ZONE 2    fluticasone propionate (FLONASE) 50 mcg/actuation nasal spray    budesonide-formoterol (SYMBICORT) 160-4.5 mcg/actuation HFAA    budesonide-formoterol (SYMBICORT) 160-4.5 mcg/actuation HFAA       Ingrid Hickman MD/MPH     Pulmonary, Critical Care Medicine  [facility]

## 2019-07-25 ENCOUNTER — HOSPITAL ENCOUNTER (OUTPATIENT)
Dept: LAB | Age: 24
Discharge: HOME | End: 2019-07-25

## 2019-07-25 LAB — XX-LABCORP SPECIMEN COL,LCBCF: NORMAL

## 2019-07-25 PROCEDURE — 99001 SPECIMEN HANDLING PT-LAB: CPT

## 2019-07-28 LAB
A ALTERNATA IGE QN: <0.1 KU/L
A FUMIGATUS IGE QN: <0.1 KU/L
AMER ROACH IGE QN: <0.1 KU/L
BAHIA GRASS IGE QN: <0.1 KU/L
BASOPHILS # BLD AUTO: 0.1 X10E3/UL (ref 0–0.2)
BASOPHILS NFR BLD AUTO: 1 %
BERMUDA GRASS IGE QN: <0.1 KU/L
BOXELDER IGE QN: <0.1 KU/L
C HERBARUM IGE QN: <0.1 KU/L
CAT DANDER IGE QN: <0.1 KU/L
CMN PIGWEED IGE QN: <0.1 KU/L
COMMON RAGWEED IGE QN: <0.1 KU/L
D FARINAE IGE QN: <0.1 KU/L
D PTERONYSS IGE QN: <0.1 KU/L
DOG DANDER IGE QN: <0.1 KU/L
ENGL PLANTAIN IGE QN: <0.1 KU/L
EOSINOPHIL # BLD AUTO: 0 X10E3/UL (ref 0–0.4)
EOSINOPHIL NFR BLD AUTO: 1 %
ERYTHROCYTE [DISTWIDTH] IN BLOOD BY AUTOMATED COUNT: 14.8 % (ref 12.3–15.4)
HCT VFR BLD AUTO: 39.2 % (ref 34–46.6)
HGB BLD-MCNC: 12.9 G/DL (ref 11.1–15.9)
IGE SERPL-ACNC: 9 IU/ML (ref 6–495)
IMM GRANULOCYTES # BLD AUTO: 0 X10E3/UL (ref 0–0.1)
IMM GRANULOCYTES NFR BLD AUTO: 0 %
JOHNSON GRASS IGE QN: <0.1 KU/L
LONDON PLANE IGE QN: <0.1 KU/L
LYMPHOCYTES # BLD AUTO: 2.2 X10E3/UL (ref 0.7–3.1)
LYMPHOCYTES NFR BLD AUTO: 45 %
Lab: NORMAL
M RACEMOSUS IGE QN: <0.1 KU/L
MCH RBC QN AUTO: 28.9 PG (ref 26.6–33)
MCHC RBC AUTO-ENTMCNC: 32.9 G/DL (ref 31.5–35.7)
MCV RBC AUTO: 88 FL (ref 79–97)
MONOCYTES # BLD AUTO: 0.4 X10E3/UL (ref 0.1–0.9)
MONOCYTES NFR BLD AUTO: 7 %
MT JUNIPER IGE QN: <0.1 KU/L
MUGWORT IGE QN: <0.1 KU/L
NETTLE IGE QN: <0.1 KU/L
NEUTROPHILS # BLD AUTO: 2.3 X10E3/UL (ref 1.4–7)
NEUTROPHILS NFR BLD AUTO: 46 %
P NOTATUM IGE QN: <0.1 KU/L
PLATELET # BLD AUTO: 282 X10E3/UL (ref 150–450)
RBC # BLD AUTO: 4.46 X10E6/UL (ref 3.77–5.28)
S BOTRYOSUM IGE QN: <0.1 KU/L
SHEEP SORREL IGE QN: <0.1 KU/L
SILVER BIRCH IGE QN: <0.1 KU/L
SWEET GUM IGE QN: <0.1 KU/L
TIMOTHY IGE QN: <0.1 KU/L
WBC # BLD AUTO: 5 X10E3/UL (ref 3.4–10.8)
WHITE ELM IGE QN: <0.1 KU/L
WHITE HICKORY IGE QN: <0.1 KU/L
WHITE MULBERRY IGE QN: <0.1 KU/L
WHITE OAK IGE QN: <0.1 KU/L

## 2019-08-01 DIAGNOSIS — J45.50 POORLY CONTROLLED SEVERE PERSISTENT ASTHMA WITHOUT COMPLICATION: ICD-10-CM

## 2019-08-01 DIAGNOSIS — J30.89 NON-SEASONAL ALLERGIC RHINITIS DUE TO OTHER ALLERGIC TRIGGER: ICD-10-CM

## 2019-08-06 ENCOUNTER — OFFICE VISIT (OUTPATIENT)
Dept: FAMILY MEDICINE CLINIC | Age: 24
End: 2019-08-06

## 2019-08-06 ENCOUNTER — HOSPITAL ENCOUNTER (OUTPATIENT)
Dept: GENERAL RADIOLOGY | Age: 24
Discharge: HOME | End: 2019-08-06
Payer: COMMERCIAL

## 2019-08-06 VITALS
SYSTOLIC BLOOD PRESSURE: 98 MMHG | RESPIRATION RATE: 16 BRPM | WEIGHT: 118.4 LBS | BODY MASS INDEX: 23.25 KG/M2 | HEART RATE: 78 BPM | TEMPERATURE: 98.2 F | DIASTOLIC BLOOD PRESSURE: 72 MMHG | HEIGHT: 60 IN | OXYGEN SATURATION: 98 %

## 2019-08-06 DIAGNOSIS — R09.89 CHEST CONGESTION: ICD-10-CM

## 2019-08-06 DIAGNOSIS — R05.9 COUGH: ICD-10-CM

## 2019-08-06 DIAGNOSIS — R09.89 CHEST CONGESTION: Primary | ICD-10-CM

## 2019-08-06 DIAGNOSIS — J45.31 MILD PERSISTENT ASTHMA WITH EXACERBATION: ICD-10-CM

## 2019-08-06 PROCEDURE — 71046 X-RAY EXAM CHEST 2 VIEWS: CPT

## 2019-08-06 RX ORDER — FLUTICASONE PROPIONATE 50 MCG
SPRAY, SUSPENSION (ML) NASAL
Qty: 1 BOTTLE | Refills: 1 | Status: SHIPPED | OUTPATIENT
Start: 2019-08-06 | End: 2020-02-05 | Stop reason: SDUPTHER

## 2019-08-06 RX ORDER — PREDNISONE 20 MG/1
20 TABLET ORAL
Qty: 5 TAB | Refills: 0 | Status: SHIPPED | OUTPATIENT
Start: 2019-08-06 | End: 2019-08-11

## 2019-08-06 RX ORDER — ALBUTEROL SULFATE 0.83 MG/ML
2.5 SOLUTION RESPIRATORY (INHALATION)
Qty: 30 NEBULE | Refills: 3 | Status: SHIPPED | OUTPATIENT
Start: 2019-08-06 | End: 2022-06-08 | Stop reason: SDUPTHER

## 2019-08-06 NOTE — PROGRESS NOTES
HISTORY OF PRESENT ILLNESS  Eufemia Jones is a 22 y.o. female. HPI: Here with c/o nasal congestion, chest congestion and discomfort with breathing. Started yesterday. Has h/o asthma. Said this discomfort has woke her up from sleep. Tried to use albuterol but did not help much. No wheezing. No fever. Said felt chills . Did not measure temp yesterday. No nausea or vomiting. Said left shoulder discomfort , not really pain and it is more with deep breathing. No diaphoresis/ no palpitation. No anxiety or depression. No abdominal pain. No urinary or bowel complains. Has not been taking nasal spray or singulair. Has post nasal drip on and off and chronic sinus congestion. Visit Vitals  BP 98/72 (BP 1 Location: Left arm, BP Patient Position: Sitting)   Pulse 78   Temp 98.2 °F (36.8 °C) (Oral)   Resp 16   Ht 5' (1.524 m)   Wt 118 lb 6.4 oz (53.7 kg)   SpO2 98%   BMI 23.12 kg/m²     Review medication list, vitals, problem list,allergies. Sitting comfortable at this time. Not in an acute distress. Able to talk in full sentence and not using any extra respiratory muscle. ROS: see HPI     Physical Exam   Constitutional: She is oriented to person, place, and time. No distress. Cardiovascular: Normal rate, regular rhythm and normal heart sounds. Pulmonary/Chest: No respiratory distress. She has no wheezes. CTA   Abdominal: Soft. Bowel sounds are normal. There is no tenderness. Musculoskeletal: She exhibits no edema. Neurological: She is oriented to person, place, and time. Psychiatric: Her behavior is normal. Thought content normal.       ASSESSMENT and PLAN    ICD-10-CM ICD-9-CM    1. Chest congestion: albuterol as needed. Given oral prednisone. Also obtain chest x-ray as shoulder discomfort with deep breathing. Continue symbicort. Continue flonase. Given refill of medication. R09.89 786.9 XR CHEST PA LAT      predniSONE (DELTASONE) 20 mg tablet   2.  Mild persistent asthma with exacerbation J45.31 493.92 predniSONE (DELTASONE) 20 mg tablet      albuterol (PROVENTIL VENTOLIN) 2.5 mg /3 mL (0.083 %) nebu   3. Cough R05 786.2 XR CHEST PA LAT      predniSONE (DELTASONE) 20 mg tablet   Pt understood and agree with the plan   Advised to go to ER with any worsening of symptoms. Follow-up and Dispositions    · Return in about 1 week (around 8/13/2019).

## 2019-08-06 NOTE — PATIENT INSTRUCTIONS

## 2019-08-06 NOTE — PROGRESS NOTES
1. Have you been to the ER, urgent care clinic since your last visit? Hospitalized since your last visit? No    2. Have you seen or consulted any other health care providers outside of the [address] [place] [address] since your last visit? Include any pap smears or colon screening.  No    Chief Complaint   Patient presents with    Cough     with chest tightness x 1 day - dry cough    Shoulder Pain     left shoulder pain starting today

## 2019-08-13 ENCOUNTER — OFFICE VISIT (OUTPATIENT)
Dept: FAMILY MEDICINE CLINIC | Age: 24
End: 2019-08-13

## 2019-08-13 VITALS
OXYGEN SATURATION: 97 % | SYSTOLIC BLOOD PRESSURE: 104 MMHG | BODY MASS INDEX: 23.2 KG/M2 | TEMPERATURE: 98.5 F | RESPIRATION RATE: 16 BRPM | DIASTOLIC BLOOD PRESSURE: 80 MMHG | WEIGHT: 118.2 LBS | HEART RATE: 77 BPM | HEIGHT: 60 IN

## 2019-08-13 DIAGNOSIS — R09.89 CHEST CONGESTION: ICD-10-CM

## 2019-08-13 DIAGNOSIS — N92.6 IRREGULAR PERIODS: ICD-10-CM

## 2019-08-13 DIAGNOSIS — Z88.9 H/O SEASONAL ALLERGIES: ICD-10-CM

## 2019-08-13 DIAGNOSIS — R05.9 COUGH: ICD-10-CM

## 2019-08-13 DIAGNOSIS — Z87.09 HX OF INTRINSIC ASTHMA: ICD-10-CM

## 2019-08-13 DIAGNOSIS — R06.2 WHEEZING: ICD-10-CM

## 2019-08-13 DIAGNOSIS — R09.81 SINUS CONGESTION: ICD-10-CM

## 2019-08-13 DIAGNOSIS — Z00.00 WELL WOMAN EXAM (NO GYNECOLOGICAL EXAM): Primary | ICD-10-CM

## 2019-08-13 NOTE — PROGRESS NOTES
HISTORY OF PRESENT ILLNESS  Triston Arauz is a 22 y.o. female. HPI: here for follow up on her cough, chest congestion, nasal congestion/ sinus congestion. Wheezing on and off. Has h/o asthma. Last visit given oral prednisone  and given nasal spray. Did help little. Still had to use twice nebulizer last week. Currently no audible wheezing. Sitting comfortable. Not in an acute distress. Able to talk in full sentence. Not using any extra respiratory muscle. Still has cough on and off but no sputum. No fever. No chest pain or sob. No palpitation. No diaphoresis. No nausea or vomiting . no abdominal pain. No unusual fatigue. She has 2 dogs . It is a trigger for her allergies and asthma. She is aware. Does not smoke. Denies any GERD symptoms. No appetite or weight changes. No mood changes. Has h/o ADHD. Stable on current dose of medication and following psychiatrist.   Visit Vitals  /80 (BP 1 Location: Left arm, BP Patient Position: Sitting)   Pulse 77   Temp 98.5 °F (36.9 °C) (Oral)   Resp 16   Ht 5' (1.524 m)   Wt 118 lb 3.2 oz (53.6 kg)   SpO2 97%   BMI 23.08 kg/m²     Review medication list, vitals, problem list,allergies. Also still irregular period. Starting birth control not much change. Last period 2 wks ago. Tolerating ocps well and taking it with compliance. Does not miss the dose . Will send her to ob/gyn for further eval.   ROS: see HPI     Physical Exam   Constitutional: She is oriented to person, place, and time. No distress. Cardiovascular: Normal rate, regular rhythm and normal heart sounds. Pulmonary/Chest:   CTA   Abdominal: Soft. Bowel sounds are normal. There is no tenderness. Musculoskeletal: She exhibits no edema. Lymphadenopathy:     She has no cervical adenopathy. Neurological: She is oriented to person, place, and time. Psychiatric: Her behavior is normal.       ASSESSMENT and PLAN  1. Sinus congestion: taking nasal spray. Not helping much.  Recently got done with oral prednisone and it did not help either. Has h/o seasonal allergies. Now coming up appt with ENT for further discussion. Also tried singulair in the past but did not help so she has stopped it. Had to use nebulizer twice last week. Also on and off use of albuterol. No audible wheezing. Now also pending appt with pulmonary . Taking symbicort with compliance. Does not smoke. R09.81 478.19    2. Chest congestion R09.89 786.9    3. Wheezing R06.2 786.07    4. Hx of intrinsic asthma Z87.09 V12.69    5. H/O seasonal allergies Z88.9 V15.09    6. Cough R05 786.2    7. Irregular periods: started on birth control. She had period 2 wks ago but still has irregular period being on ocps. She is taking it with compliance as directed. Will send her to ob/gyn for further eval.  N92.6 626.4 REFERRAL TO OBSTETRICS AND GYNECOLOGY   Pt understood and agree with the plan   Review hM   F/u in a month. Also discussed her dogs could be the reason for her allergies.

## 2019-08-13 NOTE — PROGRESS NOTES
1. Have you been to the ER, urgent care clinic since your last visit? Hospitalized since your last visit? No    2. Have you seen or consulted any other health care providers outside of the [address] [place] [address] since your last visit? Include any pap smears or colon screening.  No    Chief Complaint   Patient presents with    Cough     with chest congestion    Asthma

## 2019-08-13 NOTE — PROGRESS NOTES
Subjective:   22 y.o. female for Well Woman Check. Her gyne and breast care is done elsewhere by her Ob-Gyne physician. Last pap smear 2 years ago . Will obtain notes. Said their practice is closed. No pelvic pain or vaginal discharge. Not sexually active. Does self breast exam.   no concern. Irregular menstrual period. Started her on ocps and still irregular cycle. Discussed to have an appt with ob/gyn. Done referral today. Needed a form to fill up for work. No recent travel outside [place], no cold , no fever, no night sweats, no weight or appetite changes. Never been in contact with active TB or any prior ppd done. Has cough but has h/o asthma. Recently treated with prednisone. Improving cough. Also h/o ADHD. Stable mood. Following psychiatrist. Current dose of medication helping. No behavior changes. Patient Active Problem List    Diagnosis Date Noted    Irregular periods/menstrual cycles 04/01/2019    H/O herpes genitalis 04/01/2019    Attention deficit disorder 04/01/2019    Asthma 03/18/2015     Current Outpatient Medications   Medication Sig Dispense Refill    albuterol (PROVENTIL VENTOLIN) 2.5 mg /3 mL (0.083 %) nebu 3 mL by Nebulization route every four (4) hours as needed (cough, chest tightness). 30 Nebule 3    fluticasone propionate (FLONASE) 50 mcg/actuation nasal spray 1 spray each nostril daily as needed for nasal congestion. 1 Bottle 1    dextroamphetamine-amphetamine (ADDERALL) 20 mg tablet TAKE 1 TABLET BY MOUTH ONCE DAILY FOR ADHD  0    budesonide-formoterol (SYMBICORT) 160-4.5 mcg/actuation HFAA Take 2 Puffs by inhalation two (2) times a day. 1 Inhaler 0    acyclovir (ZOVIRAX) 400 mg tablet Take 1 Tab by mouth three (3) times daily. 15 Tab 2    albuterol (PROVENTIL HFA, VENTOLIN HFA, PROAIR HFA) 90 mcg/actuation inhaler Take 2 Puffs by inhalation every six (6) hours as needed for Wheezing or Shortness of Breath.  1 Inhaler 3    norgestimate-ethinyl estradiol (ORTHO TRI-CYCLEN LO) 0.18/0.215/0.25 mg-25 mcg tab Take 1 Tab by mouth daily. 1 Dose Pack 3    ibuprofen (MOTRIN) 200 mg tablet Take  by mouth every six (6) hours as needed for Pain. Allergies   Allergen Reactions    Macrobid [Nitrofurantoin Monohyd/M-Cryst] Itching     Past Medical History:   Diagnosis Date    ADD (attention deficit disorder)     Asthma     Headache      Past Surgical History:   Procedure Laterality Date    HX HEENT Right 2010    \"skin graft in ear drum\" tympanoplasty     Family History   Problem Relation Age of Onset    Psychiatric Disorder Mother     Hypertension Maternal Grandmother     Glaucoma Maternal Grandmother     Cancer Maternal Grandfather     Cancer Paternal Grandmother     Parkinson's Disease Paternal Grandfather      Social History     Tobacco Use    Smoking status: Never Smoker    Smokeless tobacco: Never Used   Substance Use Topics    Alcohol use: Not Currently     Comment: rare      ROS: Feeling generally well. No TIA's or unusual headaches, no dysphagia. No prolonged cough. No dyspnea or chest pain on exertion. No abdominal pain, change in bowel habits, black or bloody stools. No urinary tract symptoms. No new or unusual musculoskeletal symptoms. Objective: The patient appears well, alert, oriented x 3, in no distress. Visit Vitals  /80 (BP 1 Location: Left arm, BP Patient Position: Sitting)   Pulse 77   Temp 98.5 °F (36.9 °C) (Oral)   Resp 16   Ht 5' (1.524 m)   Wt 118 lb 3.2 oz (53.6 kg)   LMP 07/29/2019   SpO2 97%   BMI 23.08 kg/m²     ENT normal.  Neck supple. No adenopathy or thyromegaly. ARINA. Lungs are clear, good air entry, no wheezes, rhonchi or rales. S1 and S2 normal, no murmurs, regular rate and rhythm. Abdomen soft without tenderness, guarding, mass or organomegaly. Extremities show no edema, normal peripheral pulses. Neurological is normal, no focal findings. Pelvic exam defer  deferred.   Breast exam: bilaterally symmetrical, no palpable lump or abnormality. No axillary lymph nodes palpable bilaterally. No nipple pain or discharge bilaterally. Assessment/Plan:     1.  Well woman exam (no gynecological exam) Z00.00 V70.0     [V70.0]   Pt understood and agree with the plan

## 2019-09-13 ENCOUNTER — OFFICE VISIT (OUTPATIENT)
Dept: PULMONOLOGY | Age: 24
End: 2019-09-13

## 2019-09-13 VITALS
HEART RATE: 95 BPM | HEIGHT: 60 IN | RESPIRATION RATE: 16 BRPM | BODY MASS INDEX: 22.58 KG/M2 | WEIGHT: 115 LBS | OXYGEN SATURATION: 94 % | TEMPERATURE: 97.8 F | DIASTOLIC BLOOD PRESSURE: 73 MMHG | SYSTOLIC BLOOD PRESSURE: 112 MMHG

## 2019-09-13 DIAGNOSIS — J32.9 CHRONIC SINUSITIS, UNSPECIFIED LOCATION: ICD-10-CM

## 2019-09-13 DIAGNOSIS — J45.50 POORLY CONTROLLED SEVERE PERSISTENT ASTHMA WITHOUT COMPLICATION: Primary | ICD-10-CM

## 2019-09-13 DIAGNOSIS — R05.3 CHRONIC COUGH: ICD-10-CM

## 2019-09-13 RX ORDER — DEXTROAMPHETAMINE SACCHARATE, AMPHETAMINE ASPARTATE MONOHYDRATE, DEXTROAMPHETAMINE SULFATE AND AMPHETAMINE SULFATE 5; 5; 5; 5 MG/1; MG/1; MG/1; MG/1
CAPSULE, EXTENDED RELEASE ORAL
Refills: 0 | COMMUNITY
Start: 2019-08-15 | End: 2019-10-16

## 2019-09-13 NOTE — PROGRESS NOTES
[facility], [address] [place] [address] [facility]  Pulmonary, Critical Care, and Sleep Medicine    Pulmonary Office Followup  Name: Lilia Rivas 22 y.o. female  MRN: [id number]  : 1995  Service Date: 19  Chief Complaint:   Chief Complaint   Patient presents with    Asthma     follow up from 2019    Allergic Rhinitis    Pneumonia    Results     CXR 2019, labs 2019       History of Present Illness:  Lilia Rivas is a 22 y.o. female, who presents to Pulmonary clinic for followup of asthma. Patient was last seen in our clinic on 2019. In the interval, pt discontinued singulair after a few months of use, a few weeks after her last visit with us. She reported no difference while being on the medication. Patient reports that her symptoms are not well controlled. Pt reports full compliance to Symbicort. Pt reports she has not used her rescue inhaler in a while. Pt reports that she used a nebulizer starting a few weeks ago. She reports that she used it frequently initially, and then didn't use it for multiple weeks and just used it this morning. Pt continues to have severe nasal drainage. Pt denies any nocturnal awakenings  Pt saw ENT and had a CT sinuses yesterday at [facility], official report pending. No exacerbations in the interval  Denies angina, fevers, chills, night sweats, sputum, hemoptysis, voice hoarseness, night sweats, weight loss, orthopnea, LE swelling.     Past Medical History:   Diagnosis Date    ADD (attention deficit disorder)     Asthma     Headache      Past Surgical History:   Procedure Laterality Date    HX HEENT Right     \"skin graft in ear drum\" tympanoplasty     Family History   Problem Relation Age of Onset    Psychiatric Disorder Mother     Hypertension Maternal Grandmother     Glaucoma Maternal Grandmother     Cancer Maternal Grandfather     Cancer Paternal Grandmother     Parkinson's Disease Paternal Grandfather      Social History     Socioeconomic History    Marital status: SINGLE     Spouse name: Not on file    Number of children: Not on file    Years of education: Not on file    Highest education level: Not on file   Occupational History    Not on file   Social Needs    Financial resource strain: Not on file    Food insecurity:     Worry: Not on file     Inability: Not on file    Transportation needs:     Medical: Not on file     Non-medical: Not on file   Tobacco Use    Smoking status: Never Smoker    Smokeless tobacco: Never Used   Substance and Sexual Activity    Alcohol use: Not Currently     Comment: rare    Drug use: No    Sexual activity: Not Currently     Partners: Male   Lifestyle    Physical activity:     Days per week: Not on file     Minutes per session: Not on file    Stress: Not on file   Relationships    Social connections:     Talks on phone: Not on file     Gets together: Not on file     Attends Islam service: Not on file     Active member of club or organization: Not on file     Attends meetings of clubs or organizations: Not on file     Relationship status: Not on file    Intimate partner violence:     Fear of current or ex partner: Not on file     Emotionally abused: Not on file     Physically abused: Not on file     Forced sexual activity: Not on file   Other Topics Concern    Not on file   Social History Narrative    Not on file   Occupational Hx: EMT student, prior dollar , no occupational exposure to coal, silica, or asbestos      Allergies: I have reviewed the allergy hx  Allergies   Allergen Reactions    Macrobid [Nitrofurantoin Monohyd/M-Cryst] Itching       Medications:  I have reviewed the patient's medications  Prior to Admission medications    Medication Sig Start Date End Date Taking?  Authorizing Provider   amphetamine-dextroamphetamine XR (ADDERALL XR) 20 mg XR capsule TAKE 1 CAPSULE BY MOUTH ONCE DAILY FOR ADHD 8/15/19  Yes Provider, Historical   phenylephrine HCl/acetaminophn (VICKS SINEX DAYTIME PO) Take 1 Tab by mouth four (4) times daily as needed. Yes Provider, Historical   albuterol (PROVENTIL VENTOLIN) 2.5 mg /3 mL (0.083 %) nebu 3 mL by Nebulization route every four (4) hours as needed (cough, chest tightness). 8/6/19  Yes Kelley Hernandez MD   fluticasone propionate (FLONASE) 50 mcg/actuation nasal spray 1 spray each nostril daily as needed for nasal congestion. 8/6/19  Yes Kelley Hernandez MD   budesonide-formoterol [facility]) 160-4.5 mcg/actuation HFAA Take 2 Puffs by inhalation two (2) times a day. 7/24/19  Yes Chidi Mckee MD   acyclovir (ZOVIRAX) 400 mg tablet Take 1 Tab by mouth three (3) times daily. 6/3/19  Yes Kelley Hernandez MD   albuterol (PROVENTIL HFA, VENTOLIN HFA, PROAIR HFA) 90 mcg/actuation inhaler Take 2 Puffs by inhalation every six (6) hours as needed for Wheezing or Shortness of Breath. 4/1/19  Yes Kelley Hernandez MD   norgestimate-ethinyl estradiol (ORTHO TRI-CYCLEN LO) 0.18/0.215/0.25 mg-25 mcg tab Take 1 Tab by mouth daily. 4/1/19  Yes Kelley Hernandez MD   ibuprofen (MOTRIN) 200 mg tablet Take  by mouth every six (6) hours as needed for Pain. Yes Provider, Historical   dextroamphetamine-amphetamine (ADDERALL) 20 mg tablet TAKE 1 TABLET BY MOUTH ONCE DAILY FOR ADHD 7/20/19 9/13/19  Provider, Historical       Immunizations:  I have reviewed the patient's immunizations  Immunization History   Administered Date(s) Administered    HPV (9-valent) 12/12/2017, 02/12/2018    HPV (Quad) 06/14/2018    Influenza Vaccine (Quad) PF 08/22/2018       Review of Systems:  A complete review of systems was performed as stated in the HPI, all others are negative.       Objective:    Physical Exam:  /73 (BP 1 Location: Right arm, BP Patient Position: Sitting)   Pulse 95   Temp 97.8 °F (36.6 °C) (Oral)   Resp 16   Ht 5' (1.524 m)   Wt 52.2 kg (115 lb)   LMP 08/31/2019   SpO2 94%   BMI 22.46 kg/m²   Vitals were personally reviewed  Gen: no acute distress, pleasant and cooperative, sitting up in chair, able to climb to exam table w/o difficulty  HEENT: normocephalic/atraumatic, PERRLA, EOMI, no scleral icterus, no oral lesions, good dentition, Mallampati I  Neck: supple, trachea midline, no JVD, no cervical and supraclavicular adenopathy  Chest: no lesions, with even rise and fall, no pectus excavatum or flail chest  CVS: regular rate rhythm, S1/S2, no murmurs/rubs/gallops  Lungs: good air entry B/L, CTABL, no wheezes/rales/rhonchi  Back: no kyphosis or scoliosis  Abdomen: soft, nontender, bowel sounds present, no hepatosplenomegaly  Ext: no pitting edema B/L, no peripheral cyanosis or clubbing  Neuro: grossly normal, AAOx3, normal strength and coordination grossly, no focal deficits  Skin: no rashes, erythema, lesions  Psych: normal memory, thought content, and processing    Labs: I have reviewed the patient's available labs  --  Lab Results   Component Value Date/Time    WBC 5.0 07/24/2019 12:00 AM    HGB 12.9 07/24/2019 12:00 AM    HCT 39.2 07/24/2019 12:00 AM    PLATELET 539 11/94/2366 12:00 AM    MCV 88 07/24/2019 12:00 AM     Lab Results   Component Value Date/Time    Immunoglobulin E 9 07/24/2019 12:00 AM       Imaging:  I have personally reviewed patient's imaging --chest x-ray from 8/6/2019 shows clear lung fields bilaterally without infiltrate, consolidation, nodule, mass, effusion. Official report per radiology  XR Results (most recent):  Results from Hospital Encounter encounter on 08/06/19   XR CHEST PA LAT    Narrative History: Chest pain and cough. Frontal and lateral views the chest.    COMPARISON: July 24, 2019. Impression Findings/impression:    No acute osseous abnormality. Lungs are clear. Unremarkable cardiac silhouette. PFTs: 7/24/2019--spirometry is normal, but shows a reduced FEV1 at 75%. No BD response.     TTE:  I have reviewed the patient's TTE results  No results found for this or any previous visit. Assessment and Plan:  22 y.o. female with:    Impression:  1. Not well controlled, severe persistent asthma: I believe symptoms are mostly driven by patient's orally controlled sinusitis/rhinitis. Serology showed no eosinophilia and CBC and normal total IgE and regional allergy panel. No indication for biologic therapy at this time  2. Allergic rhinitis  3. Chronic cough: Etiology multifactorial, due to above  4. 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 Plan:  -Advised patient to follow-up with ENT regarding CT of her sinuses and for further management regarding her chronic rhinitis.  -Add Spiriva Respimat 1.25 MCG 2 puffs once daily to her asthma regimen. Samples given in clinic.  -Continue Symbicort 160/4.5 MCG 2 puffs twice daily with spacer. Counseled patient to rinse mouth thoroughly after each use  -Continue albuterol HFA as needed. Counseled patient to premedicate prior to exercise.  -Counseled patient avoid potential triggers of asthma.  -Continue Flonase 2 sprays to each nostril once daily  -Advised patient remain active  -Immunizations reviewed. Counseled patient to get influenza vaccination yearly       Follow-up and Dispositions    · Return in about 4 months (around 1/13/2020).        Orders Placed This Encounter    tiotropium bromide (SPIRIVA RESPIMAT) 1.25 mcg/actuation inhaler    tiotropium bromide (SPIRIVA RESPIMAT) 1.25 mcg/actuation inhaler       Yudelka Deo MD/MPH     Pulmonary, Critical Care Medicine  [facility]

## 2019-09-13 NOTE — LETTER
9/16/19 Patient: Layla Yepez YOB: 1995 Date of Visit: 9/13/2019 Felix Kline MD 
[address] [place] [address] VIA In Basket Dear Felix Kline MD, Thank you for referring Ms. Yasmeen Cardsoo to [address] [place] [address] for evaluation. My notes for this consultation are attached. If you have questions, please do not hesitate to call me. I look forward to following your patient along with you. Sincerely, Talha Zuñiga MD

## 2019-09-13 NOTE — PROGRESS NOTES
Kristal Ruffin presents today for   Chief Complaint   Patient presents with    Asthma     follow up from 7/24/2019    Allergic Rhinitis    Pneumonia    Results     CXR 8/6/2019, labs 7/25/2019       Is someone accompanying this pt? No    Is the patient using any DME equipment during OV? Yes. nebulizer    -DME Company N/A    Depression Screening:  3 most recent PHQ Screens 7/24/2019   Little interest or pleasure in doing things Not at all   Feeling down, depressed, irritable, or hopeless Not at all   Total Score PHQ 2 0       Learning Assessment:  Learning Assessment 4/1/2019   PRIMARY LEARNER Patient   HIGHEST LEVEL OF EDUCATION - PRIMARY LEARNER  2 YEARS OF COLLEGE   BARRIERS PRIMARY LEARNER NONE   CO-LEARNER CAREGIVER No   PRIMARY LANGUAGE ENGLISH   LEARNER PREFERENCE PRIMARY VIDEOS   ANSWERED BY patient   RELATIONSHIP SELF       Abuse Screening:  Abuse Screening Questionnaire 4/1/2019   Do you ever feel afraid of your partner? N   Are you in a relationship with someone who physically or mentally threatens you? N   Is it safe for you to go home? Y       Fall Risk  No flowsheet data found. Coordination of Care:  1. Have you been to the ER, urgent care clinic since your last visit? Hospitalized since your last visit? No    2. Have you seen or consulted any other health care providers outside of the [facility] since your last visit? Include any pap smears or colon screening.  No

## 2019-09-27 DIAGNOSIS — Z86.19 H/O HERPES GENITALIS: ICD-10-CM

## 2019-09-27 RX ORDER — ACYCLOVIR 400 MG/1
400 TABLET ORAL 3 TIMES DAILY
Qty: 15 TAB | Refills: 2 | Status: SHIPPED | OUTPATIENT
Start: 2019-09-27 | End: 2020-06-10

## 2019-09-27 NOTE — TELEPHONE ENCOUNTER
Contacted patient and verified identity using name and date of birth (2- identifiers)  SPoke with patient and she indicated that she takes this for genital herpes and does currently have an outbreak. She stated she was not sure about following up as she received a letter in the mail from her insurance indicating that Dr. Adeel Manzo no longer takes Melody Handing. This has been corrected and the patient was made aware. Rx to pharmacy.

## 2019-09-27 NOTE — TELEPHONE ENCOUNTER
This pharmacy faxed over request for the following prescriptions to be filled:    Medication requested :   Requested Prescriptions     Pending Prescriptions Disp Refills    acyclovir (ZOVIRAX) 400 mg tablet 15 Tab 2     Sig: Take 1 Tab by mouth three (3) times daily.      PCP: ABUNDIO [facility]  Pharmacy or Print: [facility]  Mail order or Local pharmacy [phone]    Scheduled appointment if not seen by current providers in office: LOV:8-13-19 NOV: PATIENT DECLINED TO SCHEDULE APPT

## 2019-10-09 ENCOUNTER — HOSPITAL ENCOUNTER (OUTPATIENT)
Dept: CT IMAGING | Age: 24
Discharge: HOME | End: 2019-10-09
Attending: OTOLARYNGOLOGY
Payer: COMMERCIAL

## 2019-10-09 DIAGNOSIS — J32.9 CHRONIC SINUSITIS: ICD-10-CM

## 2019-10-09 PROCEDURE — 70486 CT MAXILLOFACIAL W/O DYE: CPT

## 2019-10-16 ENCOUNTER — OFFICE VISIT (OUTPATIENT)
Dept: FAMILY MEDICINE CLINIC | Age: 24
End: 2019-10-16

## 2019-10-16 VITALS
HEIGHT: 60 IN | HEART RATE: 107 BPM | TEMPERATURE: 98 F | OXYGEN SATURATION: 97 % | SYSTOLIC BLOOD PRESSURE: 104 MMHG | BODY MASS INDEX: 22.07 KG/M2 | RESPIRATION RATE: 16 BRPM | DIASTOLIC BLOOD PRESSURE: 80 MMHG | WEIGHT: 112.4 LBS

## 2019-10-16 DIAGNOSIS — N92.6 IRREGULAR PERIODS: ICD-10-CM

## 2019-10-16 DIAGNOSIS — Z88.9 H/O SEASONAL ALLERGIES: Primary | ICD-10-CM

## 2019-10-16 DIAGNOSIS — R09.81 SINUS CONGESTION: ICD-10-CM

## 2019-10-16 RX ORDER — LORAZEPAM 0.5 MG/1
1 TABLET ORAL
Refills: 0 | COMMUNITY
Start: 2019-09-19 | End: 2022-04-20

## 2019-10-16 RX ORDER — DEXTROAMPHETAMINE SACCHARATE, AMPHETAMINE ASPARTATE, DEXTROAMPHETAMINE SULFATE AND AMPHETAMINE SULFATE 7.5; 7.5; 7.5; 7.5 MG/1; MG/1; MG/1; MG/1
20 TABLET ORAL 2 TIMES DAILY
Refills: 0 | COMMUNITY
Start: 2019-09-19 | End: 2022-04-20

## 2019-10-16 NOTE — PROGRESS NOTES
1. Have you been to the ER, urgent care clinic since your last visit? Hospitalized since your last visit? [facility] [address] LOV: 10/09/19 Dx sinus infection    2. Have you seen or consulted any other health care providers outside of the [address] [place] [address] since your last visit? Include any pap smears or colon screening. 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

## 2019-10-16 NOTE — PATIENT INSTRUCTIONS
Seasonal Allergies: Care Instructions  Your Care Instructions  Allergies occur when your body's defense system (immune system) overreacts to certain substances. The immune system treats a harmless substance as if it were a harmful germ or virus. Many things can cause this to happen. Examples include pollens, medicine, food, dust, animal dander, and mold. Your allergies are seasonal if you have symptoms just at certain times of the year. In that case, you are probably allergic to pollens from certain trees, grasses, or weeds. Allergies can be mild or severe. Over-the-counter allergy medicine may help with some symptoms. Read and follow all instructions on the label. Managing your allergies is an important part of staying healthy. Your doctor may suggest that you have tests to help find the cause of your allergies. When you know what things trigger your symptoms, you can avoid them. This can prevent allergy symptoms and other health problems. In some cases, immunotherapy might help. For this treatment, you get shots or use pills that have a small amount of certain allergens in them. Your body \"gets used to\" the allergen, so you react less to it over time. This kind of treatment may help prevent or reduce some allergy symptoms. Follow-up care is a key part of your treatment and safety. Be sure to make and go to all appointments, and call your doctor if you are having problems. It's also a good idea to know your test results and keep a list of the medicines you take. How can you care for yourself at home? · Be safe with medicines. Take your medicines exactly as prescribed. Call your doctor if you think you are having a problem with your medicine. · During your allergy season, keep windows closed. If you need to use air-conditioning, change or clean all filters every month. Take a shower and change your clothes after you have been outside. · Stay inside when pollen counts are high.  Vacuum once or twice a week. Use a vacuum  with a HEPA filter or a double-thickness filter. When should you call for help? Give an epinephrine shot if:    · You think you are having a severe allergic reaction.    After giving an epinephrine shot, call 911, even if you feel better.   Call 911 if:    · You have symptoms of a severe allergic reaction. These may include:  ? Sudden raised, red areas (hives) all over your body. ? Swelling of the throat, mouth, lips, or tongue. ? Trouble breathing. ? Passing out (losing consciousness). Or you may feel very lightheaded or suddenly feel weak, confused, or restless.     · You have been given an epinephrine shot, even if you feel better.    Call your doctor now or seek immediate medical care if:    · You have symptoms of an allergic reaction, such as:  ? A rash or hives (raised, red areas on the skin). ? Itching. ? Swelling. ? Belly pain, nausea, or vomiting.    Watch closely for changes in your health, and be sure to contact your doctor if:    · You do not get better as expected. Where can you learn more? Go to http://jose-peter.info/. Enter J912 in the search box to learn more about \"Seasonal Allergies: Care Instructions. \"  Current as of: April 7, 2019  Content Version: 12.2  © 5472-6176 [employer]. Care instructions adapted under license by [employer] (which disclaims liability or warranty for this information). If you have questions about a medical condition or this instruction, always ask your healthcare professional. [address] any warranty or liability for your use of this information.

## 2019-10-16 NOTE — PROGRESS NOTES
HISTORY OF PRESENT ILLNESS  Lmaine Roman is a 22 y.o. female. HPI: Here for follow up . Last visit started ocps as irregular period. She was feeling some mood changes on medication last couple of months but this month tolerated well. Wondering if she needs to change the medication. Will observe little longer. Also she has not made an appt with ob/gyn for further discussion. currently Denies any headache, dizziness, no chest pain or trouble breathing, no arm or leg weakness. No nausea or vomiting, no weight or appetite changes, no mood changes . No urine or bowel complains, no palpitation, no diaphoresis. No abdominal pain. No cold or cough. No leg swelling. No fever. No sleep trouble. Has chronic sinus congestion on and off. Following ENT. Scheduled for sinus surgery in few days. Charlie Multani still has post nasal drip. Taking nasal spray and wondering if she can take zyrtec. I have advised her that she can take it at bedtime as needed. Will be following ENT recommendations. Visit Vitals  /80 (BP 1 Location: Left arm, BP Patient Position: Sitting)   Pulse (!) 107   Temp 98 °F (36.7 °C) (Oral)   Resp 16   Ht 5' (1.524 m)   Wt 112 lb 6.4 oz (51 kg)   SpO2 97%   BMI 21.95 kg/m²     Review medication list, vitals, problem list,allergies. Blood pressure stable being on ocps. Noted mild elevated HR. But she denies any anxiety, no palpitation or diaphoresis. Will observe. Lab Results   Component Value Date/Time    WBC 5.0 07/24/2019 12:00 AM    HGB 12.9 07/24/2019 12:00 AM    HCT 39.2 07/24/2019 12:00 AM    PLATELET 922 11/15/7589 12:00 AM    MCV 88 07/24/2019 12:00 AM     ROS: see HPI     Physical Exam   Constitutional: She is oriented to person, place, and time. No distress. HENT:   No tenderness over maxillary or frontal sinus area    Cardiovascular: Normal heart sounds. Pulmonary/Chest: She has no wheezes. Neurological: She is oriented to person, place, and time.        ASSESSMENT and PLAN ICD-10-CM ICD-9-CM    1. H/O seasonal allergies: following ENT> scheduled for sinus procedure. For now continue nasal spray and zyrtec as needed. Z88.9 V15.09    2. Sinus congestion R09.81 478.19    3. Irregular periods: became regular since started OCPs. Some mood changes but this month no symptoms. Will observe. Also she will make a follow up appt with ob/gyn to review other options to regulate her period. N92.6 626.4    Pt understood and agree with the plan   Last pap 2 years ago. Pending records. Follow-up and Dispositions    · Return in about 4 months (around 2/16/2020).

## 2019-11-06 ENCOUNTER — HOSPITAL ENCOUNTER (OUTPATIENT)
Dept: LAB | Age: 24
Discharge: HOME | End: 2019-11-06

## 2019-11-06 ENCOUNTER — HOSPITAL ENCOUNTER (OUTPATIENT)
Dept: LAB | Age: 24
Discharge: HOME | End: 2019-11-06
Payer: COMMERCIAL

## 2019-11-06 DIAGNOSIS — Z01.818 OTHER SPECIFIED PRE-OPERATIVE EXAMINATION: ICD-10-CM

## 2019-11-06 LAB
ATRIAL RATE: 89 BPM
CALCULATED P AXIS, ECG09: 28 DEGREES
CALCULATED R AXIS, ECG10: 66 DEGREES
CALCULATED T AXIS, ECG11: 27 DEGREES
DIAGNOSIS, 93000: NORMAL
P-R INTERVAL, ECG05: 142 MS
Q-T INTERVAL, ECG07: 358 MS
QRS DURATION, ECG06: 86 MS
QTC CALCULATION (BEZET), ECG08: 435 MS
VENTRICULAR RATE, ECG03: 89 BPM
XX-LABCORP SPECIMEN COL,LCBCF: NORMAL

## 2019-11-06 PROCEDURE — 99001 SPECIMEN HANDLING PT-LAB: CPT

## 2019-11-06 PROCEDURE — 93005 ELECTROCARDIOGRAM TRACING: CPT

## 2019-11-11 ENCOUNTER — HOSPITAL ENCOUNTER (OUTPATIENT)
Dept: LAB | Age: 24
Discharge: HOME | End: 2019-11-11
Payer: COMMERCIAL

## 2019-11-11 PROCEDURE — 88305 TISSUE EXAM BY PATHOLOGIST: CPT

## 2019-11-25 DIAGNOSIS — N92.6 IRREGULAR PERIODS/MENSTRUAL CYCLES: ICD-10-CM

## 2019-11-25 NOTE — TELEPHONE ENCOUNTER
This pharmacy faxed over request for the following prescriptions to be filled:    Medication requested :   Requested Prescriptions     Pending Prescriptions Disp Refills    norgestimate-ethinyl estradiol (ORTHO TRI-CYCLEN LO) 0.18/0.215/0.25 mg-25 mcg tab 1 Dose Pack 3     Sig: Take 1 Tab by mouth daily.      PCP: Víctor Odonnell or Print: [facility]   Mail order or Local pharmacy [address] [id number] [address]    Scheduled appointment if not seen by current providers in office: LOV 10/16/2019 f/u 2/17/2020

## 2019-11-26 DIAGNOSIS — N92.6 IRREGULAR PERIODS/MENSTRUAL CYCLES: ICD-10-CM

## 2019-11-26 RX ORDER — NORGESTIMATE AND ETHINYL ESTRADIOL 7DAYSX3 LO
1 KIT ORAL DAILY
Qty: 1 DOSE PACK | Refills: 3 | Status: SHIPPED | OUTPATIENT
Start: 2019-11-26 | End: 2020-02-05 | Stop reason: SDUPTHER

## 2019-11-26 RX ORDER — NORGESTIMATE AND ETHINYL ESTRADIOL 7DAYSX3 LO
1 KIT ORAL DAILY
Qty: 1 DOSE PACK | Refills: 3 | OUTPATIENT
Start: 2019-11-26

## 2020-01-15 ENCOUNTER — OFFICE VISIT (OUTPATIENT)
Dept: PULMONOLOGY | Age: 25
End: 2020-01-15

## 2020-01-15 VITALS
HEIGHT: 61 IN | SYSTOLIC BLOOD PRESSURE: 122 MMHG | HEART RATE: 97 BPM | DIASTOLIC BLOOD PRESSURE: 82 MMHG | OXYGEN SATURATION: 97 % | RESPIRATION RATE: 18 BRPM | WEIGHT: 103 LBS | TEMPERATURE: 98.3 F | BODY MASS INDEX: 19.45 KG/M2

## 2020-01-15 DIAGNOSIS — J31.0 CHRONIC RHINITIS: ICD-10-CM

## 2020-01-15 DIAGNOSIS — Z91.14 POOR COMPLIANCE WITH MEDICATION: ICD-10-CM

## 2020-01-15 DIAGNOSIS — R05.3 CHRONIC COUGH: ICD-10-CM

## 2020-01-15 DIAGNOSIS — J45.50 NOT WELL CONTROLLED SEVERE PERSISTENT ASTHMA: Primary | ICD-10-CM

## 2020-01-15 RX ORDER — MONTELUKAST SODIUM 10 MG/1
10 TABLET ORAL DAILY
Qty: 90 TAB | Refills: 3 | Status: SHIPPED | OUTPATIENT
Start: 2020-01-15

## 2020-01-15 NOTE — PROGRESS NOTES
Luis F Rodriguez presents today for   Chief Complaint   Patient presents with    Asthma     Chronic sinusitis.  Cough       Is someone accompanying this pt? No    Is the patient using any DME equipment during OV? No    -DME Company NA    Depression Screening:  3 most recent PHQ Screens 9/13/2019   Little interest or pleasure in doing things Not at all   Feeling down, depressed, irritable, or hopeless Not at all   Total Score PHQ 2 0       Learning Assessment:  Learning Assessment 4/1/2019   PRIMARY LEARNER Patient   HIGHEST LEVEL OF EDUCATION - PRIMARY LEARNER  2 YEARS OF COLLEGE   BARRIERS PRIMARY LEARNER NONE   CO-LEARNER CAREGIVER No   PRIMARY LANGUAGE ENGLISH   LEARNER PREFERENCE PRIMARY VIDEOS   ANSWERED BY patient   RELATIONSHIP SELF       Abuse Screening:  Abuse Screening Questionnaire 4/1/2019   Do you ever feel afraid of your partner? N   Are you in a relationship with someone who physically or mentally threatens you? N   Is it safe for you to go home? Y       Fall Risk  No flowsheet data found. Coordination of Care:  1. Have you been to the ER, urgent care clinic since your last visit? Hospitalized since your last visit? Yes; Name: HBV Sinus Out pt procedure 11/2019    2. Have you seen or consulted any other health care providers outside of the [address] [place] [address] since your last visit? Include any pap smears or colon screening.  No.

## 2020-01-15 NOTE — PROGRESS NOTES
[facility], [facility]. [place] [facility]  Pulmonary, Critical Care, and Sleep Medicine    Pulmonary Office Followup  Name: Eliana Orozco 22 y.o. female  MRN: [id number]  : 1995  Service Date: 01/15/20  Chief Complaint:   Chief Complaint   Patient presents with    Asthma     Chronic sinusitis.  Cough       History of Present Illness:  Eliana Orozco is a 22 y.o. female, who presents to Pulmonary clinic for followup of asthma. Patient was last seen in our clinic on 2019. Pt reports some improvement with Spiriva respimat but not always using it. Pt reports that she stopped using all of her inhalers during November, held for about a week after having sinus surgery. Pt reports that she has been not well compliant since then. Patient reports she is more symptomatic. Patient reports increased cough and congestion. Patient had sinus surgery done by Dr. Tasha Clifton in November, pt currently on sinus rinses with ABx + budesonide. PRN albuterol use about once or twice in the last month  Pt reports worsening cough, nonproductive, worsened with PRN albuterol, worsened with cold air and exertion, as well as her other triggers of asthma. No other modifying factors. 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  Pt reported that increased nasal drainage a few weeks ago - went to [facility] -- was prescribed an antibiotic. Patient reports her symptoms of nasal drainage improved, but her chest still feels congested. Patient denies any courses of steroids in the interval.  Of note, patient has tried allergy shots in the past, noted no improvement. Patient also reports her spacer is currently leaking about 50% of contents  Denies angina, fevers, chills, night sweats, sputum, hemoptysis, voice hoarseness, night sweats, weight loss, orthopnea, LE swelling.     Past Medical History:   Diagnosis Date    ADD (attention deficit disorder)     Asthma     Headache      Past Surgical History:   Procedure Laterality Date    HX HEENT Right 2010    \"skin graft in ear drum\" tympanoplasty    IR INJ SINUS TRACT THERAP Bilateral 11/11/2019    Removal of sinus tissue to widen cavity.      Family History   Problem Relation Age of Onset    Psychiatric Disorder Mother     Hypertension Maternal Grandmother     Glaucoma Maternal Grandmother     Cancer Maternal Grandfather     Cancer Paternal Grandmother     Parkinson's Disease Paternal Grandfather      Social History     Socioeconomic History    Marital status: SINGLE     Spouse name: Not on file    Number of children: Not on file    Years of education: Not on file    Highest education level: Not on file   Occupational History    Not on file   Social Needs    Financial resource strain: Not on file    Food insecurity:     Worry: Not on file     Inability: Not on file    Transportation needs:     Medical: Not on file     Non-medical: Not on file   Tobacco Use    Smoking status: Never Smoker    Smokeless tobacco: Never Used   Substance and Sexual Activity    Alcohol use: Not Currently     Comment: rare    Drug use: No    Sexual activity: Not Currently     Partners: Male   Lifestyle    Physical activity:     Days per week: Not on file     Minutes per session: Not on file    Stress: Not on file   Relationships    Social connections:     Talks on phone: Not on file     Gets together: Not on file     Attends Restoration service: Not on file     Active member of club or organization: Not on file     Attends meetings of clubs or organizations: Not on file     Relationship status: Not on file    Intimate partner violence:     Fear of current or ex partner: Not on file     Emotionally abused: Not on file     Physically abused: Not on file     Forced sexual activity: Not on file   Other Topics Concern    Not on file   Social History Narrative    Not on file   Occupational Hx: EMT student, prior [employer] , no occupational exposure to coal, silica, or asbestos      Allergies: I have reviewed the allergy hx  Allergies   Allergen Reactions    Macrobid [Nitrofurantoin Monohyd/M-Cryst] Itching       Medications:  I have reviewed the patient's medications  Prior to Admission medications    Medication Sig Start Date End Date Taking? Authorizing Provider   norgestimate-ethinyl estradiol (ORTHO TRI-CYCLEN LO) 0.18/0.215/0.25 mg-25 mcg tab Take 1 Tab by mouth daily. 11/26/19  Yes Demetria Vaughn MD   dextroamphetamine-amphetamine (ADDERALL) 30 mg tablet Take 1 Tab by mouth two (2) times a day. 9/19/19  Yes Provider, Historical   LORazepam (ATIVAN) 0.5 mg tablet Take 1 Tab by mouth daily as needed. 9/19/19  Yes Provider, Historical   acyclovir (ZOVIRAX) 400 mg tablet Take 1 Tab by mouth three (3) times daily. 9/27/19  Yes Demetria Vaughn MD   tiotropium bromide (SPIRIVA RESPIMAT) 1.25 mcg/actuation inhaler Take 2 Puffs by inhalation daily. 9/13/19  Yes Majo Carreon MD   tiotropium bromide (SPIRIVA RESPIMAT) 1.25 mcg/actuation inhaler Take 2 Puffs by inhalation daily. 9/13/19  Yes Majo Carreon MD   albuterol (PROVENTIL VENTOLIN) 2.5 mg /3 mL (0.083 %) nebu 3 mL by Nebulization route every four (4) hours as needed (cough, chest tightness). 8/6/19  Yes Demetria Vaughn MD   fluticasone propionate (FLONASE) 50 mcg/actuation nasal spray 1 spray each nostril daily as needed for nasal congestion. 8/6/19  Yes Demetria Vaughn MD   budesonide-formoterol [facility]) 160-4.5 mcg/actuation HFAA Take 2 Puffs by inhalation two (2) times a day. 7/24/19  Yes Majo Carreon MD   albuterol (PROVENTIL HFA, VENTOLIN HFA, PROAIR HFA) 90 mcg/actuation inhaler Take 2 Puffs by inhalation every six (6) hours as needed for Wheezing or Shortness of Breath. 4/1/19  Yes Demetria Vaughn MD   ibuprofen (MOTRIN) 200 mg tablet Take  by mouth every six (6) hours as needed for Pain.    Yes Provider, Historical amoxicillin/potassium clav (AUGMENTIN PO) Take  by mouth. Provider, Historical       Immunizations:  I have reviewed the patient's immunizations  Immunization History   Administered Date(s) Administered    HPV (9-valent) 12/12/2017, 02/12/2018    HPV (Quad) 06/14/2018    Influenza Vaccine (Quad) PF 08/22/2018, 08/26/2019       Review of Systems:  A complete review of systems was performed as stated in the HPI, all others are negative. Objective:    Physical Exam:  /82 (BP 1 Location: Right arm, BP Patient Position: Sitting)   Pulse 97   Temp 98.3 °F (36.8 °C) (Oral)   Resp 18   Ht 5' 1\" (1.549 m)   Wt 46.7 kg (103 lb)   SpO2 97%   BMI 19.46 kg/m²   Vitals were personally reviewed  Gen: no acute distress, pleasant and cooperative, sitting up in chair, able to climb to exam table w/o difficulty  HEENT: normocephalic/atraumatic, PERRLA, EOMI, no scleral icterus, no oral lesions, good dentition, Mallampati I  Neck: supple, trachea midline, no JVD, no cervical and supraclavicular adenopathy  Chest: no lesions, with even rise and fall, no pectus excavatum or flail chest  CVS: regular rate rhythm, S1/S2, no murmurs/rubs/gallops  Lungs: good air entry B/L, CTABL, no wheezes/rales/rhonchi  Back: no kyphosis or scoliosis  Abdomen: soft, nontender, bowel sounds present, no hepatosplenomegaly  Ext: no pitting edema B/L, no peripheral cyanosis or clubbing  Neuro: grossly normal, AAOx3, normal strength and coordination grossly, no focal deficits  Skin: no rashes, erythema, lesions  Psych: normal memory, thought content, and processing    Labs:   I have reviewed the patient's available labs  --  Lab Results   Component Value Date/Time    WBC 5.0 07/24/2019 12:00 AM    HGB 12.9 07/24/2019 12:00 AM    HCT 39.2 07/24/2019 12:00 AM    PLATELET 677 93/86/4282 12:00 AM    MCV 88 07/24/2019 12:00 AM     Lab Results   Component Value Date/Time    Immunoglobulin E 9 07/24/2019 12:00 AM       Imaging:  I have personally reviewed patient's imaging --no new studies in the interval    PFTs: 7/24/2019--spirometry is normal, but shows a reduced FEV1 at 75%. No BD response. TTE:  I have reviewed the patient's TTE results  No results found for this or any previous visit. Assessment and Plan:  22 y.o. female with:    Impression:  1. Not well controlled, severe persistent asthma: Symptoms improved with LAMA addition, but then patient discontinued all inhaler use temporarily during sinus surgery due to noncompliance. Patient has resumed inhalers, but does not take consistently. 2.  Chronic rhinitis  3. Chronic cough: Etiology multifactorial, due to above  4. 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 Plan:  -Advised patient to continue Symbicort 160/4.5 MCG 2 puffs twice daily with spacer. New prescription for spacer provided in clinic. Counseled patient to rinse mouth thoroughly after each use  -Continue Spiriva Respimat 1.25 MCG 2 puffs once daily. Since patient has switched insurance, will switch to Incruse Ellipta once daily when her current prescription runs out, prescription and coupon card given to patient in clinic today. -Management of sinus disease per ENT  -Start Singulair 10 mg nightly  -Continue albuterol HFA as needed. Counseled patient to premedicate prior to exercise.  -Counseled patient avoid potential triggers of asthma.  -Counseled patient regarding poor medication compliance  -Continue Flonase 2 sprays to each nostril once daily  -Advised patient remain active  -Immunizations reviewed, influenza vaccination up-to-date    Follow-up and Dispositions    · Return in about 4 months (around 5/15/2020).        Orders Placed This Encounter    montelukast (SINGULAIR) 10 mg tablet    umeclidinium (INCRUSE ELLIPTA) 62.5 mcg/actuation inhaler    inhalational spacing device (AEROCHAMBER Z-STAT PLUS)         Ana María Curry MD/MPH     Pulmonary, Critical Care Medicine  [facility]

## 2020-01-15 NOTE — LETTER
1/15/20 Patient: Walter Beach YOB: 1995 Date of Visit: 1/15/2020 Clifford Blankenship MD 
[address] [place] [address] [address] [id number] [address] [place] [address] VIA In Basket Dear Clifford Blankenship MD, Thank you for referring Ms. Salome Burnham to [address] [place] [address] for evaluation. My notes for this consultation are attached. If you have questions, please do not hesitate to call me. I look forward to following your patient along with you. Sincerely, Farzana Mar MD

## 2020-02-05 ENCOUNTER — OFFICE VISIT (OUTPATIENT)
Dept: FAMILY MEDICINE CLINIC | Age: 25
End: 2020-02-05

## 2020-02-05 VITALS
DIASTOLIC BLOOD PRESSURE: 76 MMHG | OXYGEN SATURATION: 99 % | HEART RATE: 90 BPM | HEIGHT: 61 IN | WEIGHT: 105.8 LBS | TEMPERATURE: 97.6 F | RESPIRATION RATE: 16 BRPM | SYSTOLIC BLOOD PRESSURE: 120 MMHG | BODY MASS INDEX: 19.98 KG/M2

## 2020-02-05 DIAGNOSIS — J45.20 MILD INTERMITTENT ASTHMA WITHOUT COMPLICATION: ICD-10-CM

## 2020-02-05 DIAGNOSIS — N92.6 IRREGULAR PERIODS/MENSTRUAL CYCLES: Primary | ICD-10-CM

## 2020-02-05 DIAGNOSIS — Z88.9 H/O SEASONAL ALLERGIES: ICD-10-CM

## 2020-02-05 DIAGNOSIS — F98.8 ATTENTION DEFICIT DISORDER, UNSPECIFIED HYPERACTIVITY PRESENCE: ICD-10-CM

## 2020-02-05 DIAGNOSIS — G47.9 SLEEP TROUBLE: ICD-10-CM

## 2020-02-05 RX ORDER — MUPIROCIN 20 MG/G
OINTMENT TOPICAL
Refills: 0 | COMMUNITY
Start: 2019-11-19 | End: 2021-02-15 | Stop reason: ALTCHOICE

## 2020-02-05 RX ORDER — NORGESTIMATE AND ETHINYL ESTRADIOL 7DAYSX3 LO
1 KIT ORAL DAILY
Qty: 3 DOSE PACK | Refills: 4 | Status: SHIPPED | OUTPATIENT
Start: 2020-02-05 | End: 2021-02-08 | Stop reason: SDUPTHER

## 2020-02-05 RX ORDER — MINERAL OIL
180 ENEMA (ML) RECTAL DAILY
COMMUNITY

## 2020-02-05 RX ORDER — FLUTICASONE PROPIONATE 50 MCG
SPRAY, SUSPENSION (ML) NASAL
Qty: 1 BOTTLE | Refills: 1 | Status: SHIPPED | OUTPATIENT
Start: 2020-02-05

## 2020-02-05 RX ORDER — ONDANSETRON 4 MG/1
TABLET, FILM COATED ORAL
Refills: 0 | COMMUNITY
Start: 2019-11-13 | End: 2021-02-15

## 2020-02-05 RX ORDER — BUDESONIDE 0.5 MG/2ML
500 INHALANT ORAL 2 TIMES DAILY
COMMUNITY
Start: 2019-12-19

## 2020-02-05 NOTE — PROGRESS NOTES
1. Have you been to the ER, urgent care clinic since your last visit? Hospitalized since your last visit? No    2. Have you seen or consulted any other health care providers outside of the [address] [place] [address] since your last visit? Include any pap smears or colon screening.  No      Chief Complaint   Patient presents with    Irregular Menses

## 2020-02-05 NOTE — PROGRESS NOTES
HISTORY OF PRESENT ILLNESS  Patrica Hickman is a 22 y.o. female. HPI: Here for routine follow up . H/o irregular period. Now improved with birth control. No side effects. She torres shave h/o ADHD. That leads to some mood changes but she is following psychiatrist recommendations. Said some sleep trouble lately. Discussed sleep hygiene and she will try to follow it and discuss further with her psychiatrist regarding medication adjustment. No anxiety or panic attack. No thoughts of hurting herself or others. Said on ADHD medication she does feel poor appetite. Some weight changes. Discussed that is a usual side effects. For now discussed to take multivitamin and f/u next visit. Visit Vitals  /76 (BP 1 Location: Left arm, BP Patient Position: Sitting)   Pulse 90   Temp 97.6 °F (36.4 °C) (Oral)   Resp 16   Ht 5' 1\" (1.549 m)   Wt 105 lb 12.8 oz (48 kg)   SpO2 99%   BMI 19.99 kg/m²     Review medication list, vitals, problem list,allergies. Review HM  She is over due for pap smear. Advised to schedule an appt for physical.  She will do that. Denies any headache, dizziness, no chest pain or trouble breathing, no arm or leg weakness. No nausea or vomiting, no weight or appetite changes. No urine or bowel complains, no palpitation, no diaphoresis. No abdominal pain. No cold or cough. No leg swelling. No fever. Lab Results   Component Value Date/Time    WBC 5.0 07/24/2019 12:00 AM    HGB 12.9 07/24/2019 12:00 AM    HCT 39.2 07/24/2019 12:00 AM    PLATELET 678 21/09/6399 12:00 AM    MCV 88 07/24/2019 12:00 AM   h/o asthma. Seen pulmonary. Made adjustment in medication. Denies any increase use of albuterol. No wheezing. No chest pain or sob. No palpitation. ROS: see HPI     Physical Exam  Constitutional:       General: She is not in acute distress. Cardiovascular:      Rate and Rhythm: Normal rate and regular rhythm. Heart sounds: Normal heart sounds.    Abdominal:      General: Bowel sounds are normal.      Palpations: Abdomen is soft. Tenderness: There is no abdominal tenderness. Lymphadenopathy:      Cervical: No cervical adenopathy. Neurological:      Mental Status: She is oriented to person, place, and time. Psychiatric:         Behavior: Behavior normal.         ASSESSMENT and PLAN    ICD-10-CM ICD-9-CM    1. Irregular periods/menstrual cycles: stable on birth control pills. N92.6 626.4 norgestimate-ethinyl estradioL (ORTHO TRI-CYCLEN LO) 0.18/0.215/0.25 mg-25 mcg tab   2. Mild intermittent asthma without complication; stable. No increase use of albuterol. Seen pulmonary and made adjustment in medication. Following their recommendations. No wheezing or cough. J45.20 493.90     seen pulmonary    3. H/O seasonal allergies Z88.9 V15.09    4. Attention deficit disorder, unspecified hyperactivity presence: poor appetite. Will follow specialist recommendations. For now advised multivitamin and see how she does. Healthy diet. F98.8 314.00     following psychiatrist    5. Sleep trouble: sleep hygiene discussed. She will also speak with her psychiatrist regarding medication adjustment. G47.9 780.50    Pt understood and agree with the plan   Review hM   Follow-up and Dispositions    · Return for need an appt for physical/ need pap .

## 2020-02-05 NOTE — PATIENT INSTRUCTIONS
Learning About Sleeping Well  What does sleeping well mean? Sleeping well means getting enough sleep. How much sleep is enough varies among people. The number of hours you sleep is not as important as how you feel when you wake up. If you do not feel refreshed, you probably need more sleep. Another sign of not getting enough sleep is feeling tired during the day. The average total nightly sleep time is 7½ to 8 hours. Healthy adults may need a little more or a little less than this. Why is getting enough sleep important? Getting enough quality sleep is a basic part of good health. When your sleep suffers, your mood and your thoughts can suffer too. You may find yourself feeling more grumpy or stressed. Not getting enough sleep also can lead to serious problems, including injury, accidents, anxiety, and depression. What might cause poor sleeping? Many things can cause sleep problems, including:  · Stress. Stress can be caused by fear about a single event, such as giving a speech. Or you may have ongoing stress, such as worry about work or school. · Depression, anxiety, and other mental or emotional conditions. · Changes in your sleep habits or surroundings. This includes changes that happen where you sleep, such as noise, light, or sleeping in a different bed. It also includes changes in your sleep pattern, such as having jet lag or working a late shift. · Health problems, such as pain, breathing problems, and restless legs syndrome. · Lack of regular exercise. How can you help yourself? Here are some tips that may help you sleep more soundly and wake up feeling more refreshed. Your sleeping area  · Use your bedroom only for sleeping and sex. A bit of light reading may help you fall asleep. But if it doesn't, do your reading elsewhere in the house. Don't watch TV in bed. · Be sure your bed is big enough to stretch out comfortably, especially if you have a sleep partner.   · Keep your bedroom quiet, dark, and cool. Use curtains, blinds, or a sleep mask to block out light. To block out noise, use earplugs, soothing music, or a \"white noise\" machine. Your evening and bedtime routine  · Create a relaxing bedtime routine. You might want to take a warm shower or bath, listen to soothing music, or drink a cup of noncaffeinated tea. · Go to bed at the same time every night. And get up at the same time every morning, even if you feel tired. What to avoid  · Limit caffeine (coffee, tea, caffeinated sodas) during the day, and don't have any for at least 4 to 6 hours before bedtime. · Don't drink alcohol before bedtime. Alcohol can cause you to wake up more often during the night. · Don't smoke or use tobacco, especially in the evening. Nicotine can keep you awake. · Don't take naps during the day, especially close to bedtime. · Don't lie in bed awake for too long. If you can't fall asleep, or if you wake up in the middle of the night and can't get back to sleep within 15 minutes or so, get out of bed and go to another room until you feel sleepy. · Don't take medicine right before bed that may keep you awake or make you feel hyper or energized. Your doctor can tell you if your medicine may do this and if you can take it earlier in the day. If you can't sleep  · Imagine yourself in a peaceful, pleasant scene. Focus on the details and feelings of being in a place that is relaxing. · Get up and do a quiet or boring activity until you feel sleepy. · Don't drink any liquids after 6 p.m. if you wake up often because you have to go to the bathroom. Where can you learn more? Go to http://jose-peter.info/. Enter N656 in the search box to learn more about \"Learning About Sleeping Well. \"  Current as of: May 28, 2019  Content Version: 12.2  © 7862-2662 [employer], [employer].  Care instructions adapted under license by [employer] (which disclaims liability or warranty for this information). If you have questions about a medical condition or this instruction, always ask your healthcare professional. [address] any warranty or liability for your use of this information. Learning About Stimulant Medicines for Attention Deficit Hyperactivity Disorder (ADHD)  How are stimulant medicines used to treat ADHD? Stimulant medicines affect certain chemicals in the brain. They can help a person with ADHD to focus better. And they can make the person less hyperactive and impulsive. ADHD is treated with medicines and behavior therapy. Stimulants are the medicines used most.  What are some types of these medicines? Stimulant medicines may include:  · Dexmethylphenidate (Focalin). · Lisdexamfetamine (Vyvanse). · Methylphenidate (Concerta, Daytrana, Metadate CD, Methylin, Ritalin). · Mixed salts amphetamine (Adderall). How do you take them safely? · Take your medicines exactly as prescribed. Call your doctor if you think you are having a problem with your medicine. You will get more details on the specific medicines your doctor prescribes. · Do not take \"make-up\" doses. If you miss a dose, and if it's not too late in the day, it's okay to take it. But don't double up doses. · Do not misuse your medicine. Some medicines for ADHD can be misused. Some people may take a larger dose than prescribed. They may take them for their non-medical effects. Or they may share or sell them. Misuse can lead to a stimulant use disorder. · Keep close track of your medicine. Don't sell or give medicine to other people. What are the side effects? Common side effects include loss of appetite, a headache, and an upset stomach. You may also have mood changes or sleep problems. Or you may feel nervous. Some stimulant medicines can cause a dry mouth.   If these medicines have bothersome side effects or don't work for you, your doctor might prescribe another type of medicine. How long can you expect to use these medicines? Most doctors prescribe a low dose of stimulant medicines at first. Your doctor may have you slowly increase the dose until your symptoms are managed. Or you might get a different medicine or treatment. This can take several weeks. Some doctors may advise taking a break from the medicine over some weekends or during holidays. But this depends on the type of symptoms you have. You may need to take medicine for ADHD for a long time. But your doctor will check now and then to see if you can take a lower dose and still get the benefits of the medicine. If you want to stop or reduce your use of the medicine, talk to your doctor first. Some signs that you may be able to lower or stop your dose include:  · You have no symptoms for more than a year while you take the medicine. · You are doing better at the same dose. · Your behavior is appropriate even if you miss a dose or two. · You are newly able to concentrate. Follow-up care is a key part of your treatment and safety. Be sure to make and go to all appointments, and call your doctor if you are having problems. It's also a good idea to know your test results and keep a list of the medicines you take. Where can you learn more? Go to http://jose-peter.info/. Enter S155 in the search box to learn more about \"Learning About Stimulant Medicines for Attention Deficit Hyperactivity Disorder (ADHD). \"  Current as of: May 28, 2019  Content Version: 12.2  © 7162-8133 [employer]. Care instructions adapted under license by [employer] (which disclaims liability or warranty for this information). If you have questions about a medical condition or this instruction, always ask your healthcare professional. [place] [address] any warranty or liability for your use of this information.          Attention Deficit Hyperactivity Disorder (ADHD) in Adults: Care Instructions  Your Care Instructions    Attention deficit hyperactivity disorder, or ADHD, is a condition that makes it hard to pay attention. So you may have problems when you try to focus, get organized, and finish tasks. It might make you more active than other people. Or you might do things without thinking first.  ADHD is very common. It usually starts in early childhood. Many adults don't realize they have it until their children are diagnosed. Then they become aware of their own symptoms. Doctors don't know what causes ADHD. But it often runs in families. ADHD can be treated with medicines, behavior training, and counseling. Treatment can improve your life. Follow-up care is a key part of your treatment and safety. Be sure to make and go to all appointments, and call your doctor if you are having problems. It's also a good idea to know your test results and keep a list of the medicines you take. How can you care for yourself at home? · Learn all you can about ADHD. This will help you and your family understand it better. · Take your medicines exactly as prescribed. Call your doctor if you think you are having a problem with your medicine. You will get more details on the specific medicines your doctor prescribes. · If you miss a dose of your medicine, do not take an extra dose. · If your doctor suggests counseling, find a counselor you like and trust. Talk openly and honestly. Be willing to make some changes. · Find a support group for adults with ADHD. Talking to others with the same problems can help you feel better. It can also give you ideas about how to best cope with the condition. · Get rid of distractions at your work space. Keep your desk clean. Try not to face a window or busy hallway. · Use files, planners, and other tools to keep you organized. · Limit use of alcohol, and do not use illegal drugs. People with ADHD tend to develop substance use disorder more easily than others.  Tell your doctor if you need help to quit. Counseling, support groups, and sometimes medicines can help you stay free of alcohol or drugs. · Get at least 30 minutes of physical activity on most days of the week. Exercise has been shown to help people cope with ADHD. Walking is a good choice. You also may want to do other activities, such as running, swimming, cycling, or playing tennis or team sports. When should you call for help? Watch closely for changes in your health, and be sure to contact your doctor if:    · You feel sad a lot or cry all the time.     · You have trouble sleeping, or you sleep too much.     · You find it hard to concentrate, make decisions, or remember things.     · You change how you normally eat.     · You feel guilty for no reason. Where can you learn more? Go to http://jose-peter.info/. Enter B196 in the search box to learn more about \"Attention Deficit Hyperactivity Disorder (ADHD) in Adults: Care Instructions. \"  Current as of: May 28, 2019  Content Version: 12.2  © 7256-8517 [employer]. Care instructions adapted under license by [email or web address] (which disclaims liability or warranty for this information). If you have questions about a medical condition or this instruction, always ask your healthcare professional. [address] any warranty or liability for your use of this information. A Healthy Lifestyle: Care Instructions  Your Care Instructions    A healthy lifestyle can help you feel good, stay at a healthy weight, and have plenty of energy for both work and play. A healthy lifestyle is something you can share with your whole family. A healthy lifestyle also can lower your risk for serious health problems, such as high blood pressure, heart disease, and diabetes. You can follow a few steps listed below to improve your health and the health of your family.   Follow-up care is a key part of your treatment and safety. Be sure to make and go to all appointments, and call your doctor if you are having problems. It's also a good idea to know your test results and keep a list of the medicines you take. How can you care for yourself at home? · Do not eat too much sugar, fat, or fast foods. You can still have dessert and treats now and then. The goal is moderation. · Start small to improve your eating habits. Pay attention to portion sizes, drink less juice and soda pop, and eat more fruits and vegetables. ? Eat a healthy amount of food. A 3-ounce serving of meat, for example, is about the size of a deck of cards. Fill the rest of your plate with vegetables and whole grains. ? Limit the amount of soda and sports drinks you have every day. Drink more water when you are thirsty. ? Eat at least 5 servings of fruits and vegetables every day. It may seem like a lot, but it is not hard to reach this goal. A serving or helping is 1 piece of fruit, 1 cup of vegetables, or 2 cups of leafy, raw vegetables. Have an apple or some carrot sticks as an afternoon snack instead of a candy bar. Try to have fruits and/or vegetables at every meal.  · Make exercise part of your daily routine. You may want to start with simple activities, such as walking, bicycling, or slow swimming. Try to be active 30 to 60 minutes every day. You do not need to do all 30 to 60 minutes all at once. For example, you can exercise 3 times a day for 10 or 20 minutes. Moderate exercise is safe for most people, but it is always a good idea to talk to your doctor before starting an exercise program.  · Keep moving. [employer] the lawn, work in the garden, or [employer]. Take the stairs instead of the elevator at work. · If you smoke, quit. People who smoke have an increased risk for heart attack, stroke, cancer, and other lung illnesses. Quitting is hard, but there are ways to boost your chance of quitting tobacco for good. ?  Use nicotine gum, patches, or lozenges. ? Ask your doctor about stop-smoking programs and medicines. ? Keep trying. In addition to reducing your risk of diseases in the future, you will notice some benefits soon after you stop using tobacco. If you have shortness of breath or asthma symptoms, they will likely get better within a few weeks after you quit. · Limit how much alcohol you drink. Moderate amounts of alcohol (up to 2 drinks a day for men, 1 drink a day for women) are okay. But drinking too much can lead to liver problems, high blood pressure, and other health problems. Family health  If you have a family, there are many things you can do together to improve your health. · Eat meals together as a family as often as possible. · Eat healthy foods. This includes fruits, vegetables, lean meats and dairy, and whole grains. · Include your family in your fitness plan. Most people think of activities such as jogging or tennis as the way to fitness, but there are many ways you and your family can be more active. Anything that makes you breathe hard and gets your heart pumping is exercise. Here are some tips:  ? Walk to do errands or to take your child to school or the bus.  ? Go for a family bike ride after dinner instead of watching TV. Where can you learn more? Go to http://jose-peter.info/. Enter S192 in the search box to learn more about \"A Healthy Lifestyle: Care Instructions. \"  Current as of: May 28, 2019  Content Version: 12.2  © 1485-5846 [employer], [employer]. Care instructions adapted under license by [employer] (which disclaims liability or warranty for this information). If you have questions about a medical condition or this instruction, always ask your healthcare professional. [place] [address] any warranty or liability for your use of this information.

## 2020-03-23 ENCOUNTER — TELEPHONE (OUTPATIENT)
Dept: FAMILY MEDICINE CLINIC | Age: 25
End: 2020-03-23

## 2020-03-23 NOTE — TELEPHONE ENCOUNTER
Pt called and stated she need a letter stating she has asthma . Please call pt at your earliest convenience.

## 2020-05-27 ENCOUNTER — TELEPHONE (OUTPATIENT)
Dept: FAMILY MEDICINE CLINIC | Age: 25
End: 2020-05-27

## 2020-05-27 NOTE — TELEPHONE ENCOUNTER
Call patient and left a message to call back the office.   Please let me know once patient returned to call

## 2020-05-27 NOTE — TELEPHONE ENCOUNTER
Pt called and stated she need a letter to return to work asap. Please call pt at your earliest convenience.

## 2020-05-27 NOTE — TELEPHONE ENCOUNTER
Patient identified with 2 identifiers (name and ). Spoke with patient and letter needs to states she can return to work on 2020,   Please call patient when ready. Patient will  from office.

## 2020-06-09 DIAGNOSIS — Z86.19 H/O HERPES GENITALIS: ICD-10-CM

## 2020-06-10 RX ORDER — ACYCLOVIR 400 MG/1
TABLET ORAL
Qty: 15 TAB | Refills: 2 | Status: SHIPPED | OUTPATIENT
Start: 2020-06-10 | End: 2021-09-27

## 2020-08-13 ENCOUNTER — TELEPHONE (OUTPATIENT)
Dept: FAMILY MEDICINE CLINIC | Age: 25
End: 2020-08-13

## 2020-08-13 NOTE — TELEPHONE ENCOUNTER
Have you been diagnosed with, tested for, or told that you are suspected of having COVID-19 (coronovirus)? no  Have you had a fever or taken medication to treat a fever in the past 72 hours? No   Have you had a cough, SOB, or flu-like symptoms within the past 3 days? No   Have you had direct contact with someone who tested positive for COVID-19 within the past 14 days?no  Do you have a household member with flu-like symptoms, including fever, cough, or SOB? no  Do you currently have flu-like symptoms including fever, cough, or SOB? no

## 2020-08-14 ENCOUNTER — HOSPITAL ENCOUNTER (OUTPATIENT)
Dept: LAB | Age: 25
Discharge: HOME | End: 2020-08-14
Payer: COMMERCIAL

## 2020-08-14 ENCOUNTER — OFFICE VISIT (OUTPATIENT)
Dept: FAMILY MEDICINE CLINIC | Age: 25
End: 2020-08-14

## 2020-08-14 VITALS
OXYGEN SATURATION: 98 % | TEMPERATURE: 98.4 F | SYSTOLIC BLOOD PRESSURE: 96 MMHG | HEIGHT: 61 IN | WEIGHT: 113 LBS | RESPIRATION RATE: 16 BRPM | HEART RATE: 105 BPM | BODY MASS INDEX: 21.34 KG/M2 | DIASTOLIC BLOOD PRESSURE: 58 MMHG

## 2020-08-14 DIAGNOSIS — J45.20 MILD INTERMITTENT ASTHMA WITHOUT COMPLICATION: ICD-10-CM

## 2020-08-14 DIAGNOSIS — F41.9 ANXIETY: ICD-10-CM

## 2020-08-14 DIAGNOSIS — Z12.4 SCREENING FOR MALIGNANT NEOPLASM OF CERVIX: ICD-10-CM

## 2020-08-14 DIAGNOSIS — Z01.419 WELL WOMAN EXAM WITH ROUTINE GYNECOLOGICAL EXAM: Primary | ICD-10-CM

## 2020-08-14 DIAGNOSIS — N92.6 IRREGULAR PERIODS/MENSTRUAL CYCLES: ICD-10-CM

## 2020-08-14 DIAGNOSIS — Z23 ENCOUNTER FOR IMMUNIZATION: ICD-10-CM

## 2020-08-14 DIAGNOSIS — G47.9 SLEEP TROUBLE: ICD-10-CM

## 2020-08-14 PROCEDURE — 88175 CYTOPATH C/V AUTO FLUID REDO: CPT

## 2020-08-14 PROCEDURE — 87624 HPV HI-RISK TYP POOLED RSLT: CPT

## 2020-08-14 NOTE — PATIENT INSTRUCTIONS
Well Visit, Ages 25 to 48: Care Instructions Your Care Instructions Physical exams can help you stay healthy. Your doctor has checked your overall health and may have suggested ways to take good care of yourself. He or she also may have recommended tests. At home, you can help prevent illness with healthy eating, regular exercise, and other steps. Follow-up care is a key part of your treatment and safety. Be sure to make and go to all appointments, and call your doctor if you are having problems. It's also a good idea to know your test results and keep a list of the medicines you take. How can you care for yourself at home? · Reach and stay at a healthy weight. This will lower your risk for many problems, such as obesity, diabetes, heart disease, and high blood pressure. · Get at least 30 minutes of physical activity on most days of the week. Walking is a good choice. You also may want to do other activities, such as running, swimming, cycling, or playing tennis or team sports. Discuss any changes in your exercise program with your doctor. · Do not smoke or allow others to smoke around you. If you need help quitting, talk to your doctor about stop-smoking programs and medicines. These can increase your chances of quitting for good. · Talk to your doctor about whether you have any risk factors for sexually transmitted infections (STIs). Having one sex partner (who does not have STIs and does not have sex with anyone else) is a good way to avoid these infections. · Use birth control if you do not want to have children at this time. Talk with your doctor about the choices available and what might be best for you. · Protect your skin from too much sun. When you're outdoors from 10 a.m. to 4 p.m., stay in the shade or cover up with clothing and a hat with a wide brim. Wear sunglasses that block UV rays. Even when it's cloudy, put broad-spectrum sunscreen (SPF 30 or higher) on any exposed skin. · See a dentist one or two times a year for checkups and to have your teeth cleaned. · Wear a seat belt in the car. Follow your doctor's advice about when to have certain tests. These tests can spot problems early. For everyone · Cholesterol. Have the fat (cholesterol) in your blood tested after age 21. Your doctor will tell you how often to have this done based on your age, family history, or other things that can increase your risk for heart disease. · Blood pressure. Have your blood pressure checked during a routine doctor visit. Your doctor will tell you how often to check your blood pressure based on your age, your blood pressure results, and other factors. · Vision. Talk with your doctor about how often to have a glaucoma test. 
· Diabetes. Ask your doctor whether you should have tests for diabetes. · Colon cancer. Your risk for colorectal cancer gets higher as you get older. Some experts say that adults should start regular screening at age 48 and stop at age 76. Others say to start before age 48 or continue after age 76. Talk with your doctor about your risk and when to start and stop screening. For women · Breast exam and mammogram. Talk to your doctor about when you should have a clinical breast exam and a mammogram. Medical experts differ on whether and how often women under 50 should have these tests. Your doctor can help you decide what is right for you. · Cervical cancer screening test and pelvic exam. Begin with a Pap test at age 24. The test often is part of a pelvic exam. Starting at age 27, you may choose to have a Pap test, an HPV test, or both tests at the same time (called co-testing). Talk with your doctor about how often to have testing. · Tests for sexually transmitted infections (STIs). Ask whether you should have tests for STIs. You may be at risk if you have sex with more than one person, especially if your partners do not wear condoms. For men · Tests for sexually transmitted infections (STIs). Ask whether you should have tests for STIs. You may be at risk if you have sex with more than one person, especially if you do not wear a condom. · Testicular cancer exam. Ask your doctor whether you should check your testicles regularly. · Prostate exam. Talk to your doctor about whether you should have a blood test (called a PSA test) for prostate cancer. Experts differ on whether and when men should have this test. Some experts suggest it if you are older than 39 and are -American or have a father or brother who got prostate cancer when he was younger than 72. When should you call for help? Watch closely for changes in your health, and be sure to contact your doctor if you have any problems or symptoms that concern you. Where can you learn more? Go to http://jose-peter.info/ Enter P072 in the search box to learn more about \"Well Visit, Ages 25 to 48: Care Instructions. \" Current as of: August 22, 2019               Content Version: 12.5 © 5606-8944 [employer]. Care instructions adapted under license by [employer] (which disclaims liability or warranty for this information). If you have questions about a medical condition or this instruction, always ask your healthcare professional. [place] [address] any warranty or liability for your use of this information. Breast Self-Exam: Care Instructions Your Care Instructions A breast self-exam is when you check your breasts for lumps or changes. This regular exam helps you learn how your breasts normally look and feel. Most breast problems or changes are not because of cancer. Breast self-exam is not a substitute for a mammogram. Having regular breast exams by your doctor and regular mammograms improve your chances of finding any problems with your breasts. Some women set a time each month to do a step-by-step breast self-exam. Other women like a less formal system. They might look at their breasts as they brush their teeth, or feel their breasts once in a while in the shower. If you notice a change in your breast, tell your doctor. Follow-up care is a key part of your treatment and safety. Be sure to make and go to all appointments, and call your doctor if you are having problems. It's also a good idea to know your test results and keep a list of the medicines you take. How do you do a breast self-exam? 
· The best time to examine your breasts is usually one week after your menstrual period begins. Your breasts should not be tender then. If you do not have periods, you might do your exam on a day of the month that is easy to remember. · To examine your breasts: ? Remove all your clothes above the waist and lie down. When you are lying down, your breast tissue spreads evenly over your chest wall, which makes it easier to feel all your breast tissue. ? Use the padsnot the fingertipsof the 3 middle fingers of your left hand to check your right breast. Move your fingers slowly in small coin-sized circles that overlap. ? Use three levels of pressure to feel of all your breast tissue. Use light pressure to feel the tissue close to the skin surface. Use medium pressure to feel a little deeper. Use firm pressure to feel your tissue close to your breastbone and ribs. Use each pressure level to feel your breast tissue before moving on to the next spot. ? Check your entire breast, moving up and down as if following a strip from the collarbone to the bra line, and from the armpit to the ribs. Repeat until you have covered the entire breast. 
? Repeat this procedure for your left breast, using the pads of the 3 middle fingers of your right hand. · To examine your breasts while in the shower: 
? Place one arm over your head and lightly soap your breast on that side. ? Using the pads of your fingers, gently move your hand over your breast (in the strip pattern described above), feeling carefully for any lumps or changes. ? Repeat for the other breast. 
· Have your doctor inspect anything you notice to see if you need further testing. Where can you learn more? Go to http://jose-peter.info/ Enter P148 in the search box to learn more about \"Breast Self-Exam: Care Instructions. \" Current as of: August 22, 2019               Content Version: 12.5 © 5141-6455 [employer]. Care instructions adapted under license by [employer] (which disclaims liability or warranty for this information). If you have questions about a medical condition or this instruction, always ask your healthcare professional. [address] any warranty or liability for your use of this information. A Healthy Lifestyle: Care Instructions Your Care Instructions A healthy lifestyle can help you feel good, stay at a healthy weight, and have plenty of energy for both work and play. A healthy lifestyle is something you can share with your whole family. A healthy lifestyle also can lower your risk for serious health problems, such as high blood pressure, heart disease, and diabetes. You can follow a few steps listed below to improve your health and the health of your family. Follow-up care is a key part of your treatment and safety. Be sure to make and go to all appointments, and call your doctor if you are having problems. It's also a good idea to know your test results and keep a list of the medicines you take. How can you care for yourself at home? · Do not eat too much sugar, fat, or fast foods. You can still have dessert and treats now and then. The goal is moderation. · Start small to improve your eating habits. Pay attention to portion sizes, drink less juice and soda pop, and eat more fruits and vegetables. ? Eat a healthy amount of food. A 3-ounce serving of meat, for example, is about the size of a deck of cards. Fill the rest of your plate with vegetables and whole grains. ? Limit the amount of soda and sports drinks you have every day. Drink more water when you are thirsty. ? Eat at least 5 servings of fruits and vegetables every day. It may seem like a lot, but it is not hard to reach this goal. A serving or helping is 1 piece of fruit, 1 cup of vegetables, or 2 cups of leafy, raw vegetables. Have an apple or some carrot sticks as an afternoon snack instead of a candy bar. Try to have fruits and/or vegetables at every meal. 
· Make exercise part of your daily routine. You may want to start with simple activities, such as walking, bicycling, or slow swimming. Try to be active 30 to 60 minutes every day. You do not need to do all 30 to 60 minutes all at once. For example, you can exercise 3 times a day for 10 or 20 minutes. Moderate exercise is safe for most people, but it is always a good idea to talk to your doctor before starting an exercise program. 
· Keep moving. Versie Gregory the lawn, work in the garden, or [employer]. Take the stairs instead of the elevator at work. · If you smoke, quit. People who smoke have an increased risk for heart attack, stroke, cancer, and other lung illnesses. Quitting is hard, but there are ways to boost your chance of quitting tobacco for good. ? Use nicotine gum, patches, or lozenges. ? Ask your doctor about stop-smoking programs and medicines. ? Keep trying. In addition to reducing your risk of diseases in the future, you will notice some benefits soon after you stop using tobacco. If you have shortness of breath or asthma symptoms, they will likely get better within a few weeks after you quit. · Limit how much alcohol you drink. Moderate amounts of alcohol (up to 2 drinks a day for men, 1 drink a day for women) are okay.  But drinking too much can lead to liver problems, high blood pressure, and other health problems. Family health If you have a family, there are many things you can do together to improve your health. · Eat meals together as a family as often as possible. · Eat healthy foods. This includes fruits, vegetables, lean meats and dairy, and whole grains. · Include your family in your fitness plan. Most people think of activities such as jogging or tennis as the way to fitness, but there are many ways you and your family can be more active. Anything that makes you breathe hard and gets your heart pumping is exercise. Here are some tips: 
? Walk to do errands or to take your child to school or the bus. 
? Go for a family bike ride after dinner instead of watching TV. Where can you learn more? Go to http://www.gray.com/ Enter I541 in the search box to learn more about \"A Healthy Lifestyle: Care Instructions. \" Current as of: January 31, 2020               Content Version: 12.5 © 2006-2020 [employer]. Care instructions adapted under license by [employer] (which disclaims liability or warranty for this information). If you have questions about a medical condition or this instruction, always ask your healthcare professional. [address] any warranty or liability for your use of this information. Pneumococcal Polysaccharide Vaccine: What You Need to Know Why get vaccinated? Pneumococcal polysaccharide vaccine (PPSV23) can prevent pneumococcal disease. Pneumococcal disease refers to any illness caused by pneumococcal bacteria. These bacteria can cause many types of illnesses, including pneumonia, which is an infection of the lungs. Pneumococcal bacteria are one of the most common causes of pneumonia. Besides pneumonia, pneumococcal bacteria can also cause: 
· Ear infections, 
· Sinus infections · Meningitis (infection of the tissue covering the brain and spinal cord) · Bacteremia (bloodstream infection) Anyone can get pneumococcal disease, but children under 3years of age, people with certain medical conditions, adults 72 years or older, and cigarette smokers are at the highest risk. Most pneumococcal infections are mild. However, some can result in long-term problems, such as brain damage or hearing loss. Meningitis, bacteremia, and pneumonia caused by pneumococcal disease can be fatal. 
PPSV23 
PPSV23 protects against 23 types of bacteria that cause pneumococcal disease. PPSV23 is recommended for: · All adults 72 years or older, · Anyone 2 years or older with certain medical conditions that can lead to an increased risk for pneumococcal disease. Most people need only one dose of PPSV23. A second dose of PPSV23, and another type of pneumococcal vaccine called PCV13, are recommended for certain high-risk groups. Your health care provider can give you more information. People 65 years or older should get a dose of PPSV23 even if they have already gotten one or more doses of the vaccine before they turned 65. Talk with your health care provider Tell your vaccine provider if the person getting the vaccine: 
· Has had an allergic reaction after a previous dose of PPSV23, or has any severe, life-threatening allergies. In some cases, your health care provider may decide to postpone PPSV23 vaccination to a future visit. People with minor illnesses, such as a cold, may be vaccinated. People who are moderately or severely ill should usually wait until they recover before getting PPSV23. Your health care provider can give you more information. Risks of a vaccine reaction · Redness or pain where the shot is given, feeling tired, fever, or muscle aches can happen after PPSV23. People sometimes faint after medical procedures, including vaccination. Tell your provider if you feel dizzy or have vision changes or ringing in the ears. As with any medicine, there is a very remote chance of a vaccine causing a severe allergic reaction, other serious injury, or death. What if there is a serious problem? An allergic reaction could occur after the vaccinated person leaves the clinic. If you see signs of a severe allergic reaction (hives, swelling of the face and throat, difficulty breathing, a fast heartbeat, dizziness, or weakness), call 9-1-1 and get the person to the nearest hospital. 
For other signs that concern you, call your health care provider. Adverse reactions should be reported to the Vaccine Adverse Event Reporting System (VAERS). Your health care provider will usually file this report, or you can do it yourself. Visit the VAERS website at www.vaers. hhs.gov at www.vaers. hhs.gov or call 8-940.144.9042. VAERS is only for reporting reactions, and VAERS staff do not give medical advice. How can I learn more? · Ask your health care provider. · Call your local or state health department. · Contact the Centers for Disease Control and Prevention (CDC): 
? Call 9-682.850.3177 (1-800-CDC-INFO) or 
? Visit CDC's website at www.cdc.gov/vaccines Vaccine Information Statement PPSV23 Vaccine 10/30/2019 Department of [facility] and [employer] Centers for Disease Control and Prevention Many Vaccine Information Statements are available in [place] and other languages. See www.immunize.org/vis. Hojas de información Sobre Vacunas están disponibles en español y en muchos otros idiomas. Visite Lana.si. Care instructions adapted under license by [employer] (which disclaims liability or warranty for this information). If you have questions about a medical condition or this instruction, always ask your healthcare professional. [address] any warranty or liability for your use of this information.

## 2020-08-14 NOTE — PROGRESS NOTES
Subjective:   26 y.o. female for Well Woman Check. Patient's last menstrual period was 07/24/2020 (exact date). Controlled. .  Menstrual cycle regular on it. No concern. Not sexually active at this time. No prior history of abnormal Pap smear or STD. No pelvic pain or vaginal discharge. Not much compliant with self breast exam.  No concern. No family history of cervical cancer, breast cancer, ovarian cancer or colon cancer. History of asthma. Stable. On and off albuterol needed but denies any increased use of albuterol. No cough, fever, chest congestion or wheezing at this time. History of anxiety. Currently some sleep concern. She is on medication and taking it with compliance. Following psychiatrist and counselor at this time. Denies any acute distress. No thoughts of hurting herself or someone else. Heart rate was around 105. Repeat was improved. No palpitation or chest pain or diaphoresis. Patient Active Problem List    Diagnosis Date Noted    Irregular periods/menstrual cycles 04/01/2019    H/O herpes genitalis 04/01/2019    Attention deficit disorder 04/01/2019    Asthma 03/18/2015     Current Outpatient Medications   Medication Sig Dispense Refill    acyclovir (ZOVIRAX) 400 mg tablet TAKE 1 TABLET BY MOUTH THREE TIMES DAILY 15 Tab 2    mupirocin (BACTROBAN) 2 % ointment PLACE 1/2 INCH OF OINTMENT INTO NETTI POT AND IRRAGATE QOD  0    ondansetron hcl (ZOFRAN) 4 mg tablet TK 1 T PO Q 8 H  0    fexofenadine (ALLEGRA ALLERGY) 180 mg tablet Take 180 mg by mouth daily.  norgestimate-ethinyl estradioL (ORTHO TRI-CYCLEN LO) 0.18/0.215/0.25 mg-25 mcg tab Take 1 Tab by mouth daily. 3 Dose Pack 4    fluticasone propionate (FLONASE) 50 mcg/actuation nasal spray 1 spray each nostril daily as needed for nasal congestion. 1 Bottle 1    montelukast (SINGULAIR) 10 mg tablet Take 1 Tab by mouth daily.  90 Tab 3    umeclidinium (INCRUSE ELLIPTA) 62.5 mcg/actuation inhaler Take 1 Puff by inhalation daily. 3 Inhaler 3    inhalational spacing device (AEROCHAMBER Z-STAT PLUS) 1 Each by Does Not Apply route as needed for Wheezing (use with symbicort). Dx. J45.5 1 Device 11    dextroamphetamine-amphetamine (ADDERALL) 30 mg tablet Take 20 mg by mouth two (2) times a day.  0    LORazepam (ATIVAN) 0.5 mg tablet Take 1 Tab by mouth daily as needed. 0    albuterol (PROVENTIL VENTOLIN) 2.5 mg /3 mL (0.083 %) nebu 3 mL by Nebulization route every four (4) hours as needed (cough, chest tightness). 30 Nebule 3    budesonide-formoterol (SYMBICORT) 160-4.5 mcg/actuation HFAA Take 2 Puffs by inhalation two (2) times a day. 1 Inhaler 0    albuterol (PROVENTIL HFA, VENTOLIN HFA, PROAIR HFA) 90 mcg/actuation inhaler Take 2 Puffs by inhalation every six (6) hours as needed for Wheezing or Shortness of Breath. 1 Inhaler 3    ibuprofen (MOTRIN) 200 mg tablet Take  by mouth every six (6) hours as needed for Pain.  budesonide (PULMICORT) 0.5 mg/2 mL nbsp U 2 ML VIA NEB BID       Allergies   Allergen Reactions    Macrobid [Nitrofurantoin Monohyd/M-Cryst] Itching     Past Medical History:   Diagnosis Date    ADD (attention deficit disorder)     Asthma     Headache      Past Surgical History:   Procedure Laterality Date    HX HEENT Right 2010    \"skin graft in ear drum\" tympanoplasty    IR INJ SINUS TRACT THERAP Bilateral 11/11/2019    Removal of sinus tissue to widen cavity. Family History   Problem Relation Age of Onset    Psychiatric Disorder Mother     Hypertension Maternal Grandmother     Glaucoma Maternal Grandmother     Cancer Maternal Grandfather     Cancer Paternal Grandmother     Parkinson's Disease Paternal Grandfather      Social History     Tobacco Use    Smoking status: Never Smoker    Smokeless tobacco: Never Used   Substance Use Topics    Alcohol use: Not Currently     Comment: rare        ROS:  Feeling well. No dyspnea or chest pain on exertion.   No abdominal pain, change in bowel habits, black or bloody stools. No urinary tract symptoms. GYN ROS: normal menses, no abnormal bleeding, pelvic pain or discharge, no breast pain or new or enlarging lumps on self exam. No neurological complaints. Objective:     Visit Vitals  BP 96/58 (BP 1 Location: Left arm, BP Patient Position: Sitting)   Pulse (!) 105   Temp 98.4 °F (36.9 °C) (Oral)   Resp 16   Ht 5' 1\" (1.549 m)   Wt 113 lb (51.3 kg)   LMP 07/24/2020 (Exact Date)   SpO2 98%   BMI 21.35 kg/m²     The patient appears well, alert, oriented x 3, in no distress. ENT normal.  Neck supple. No adenopathy or thyromegaly. ARINA. Lungs are clear, good air entry, no wheezes, rhonchi or rales. S1 and S2 normal, no murmurs, regular rate and rhythm. Abdomen soft without tenderness, guarding, mass or organomegaly. Extremities show no edema, normal peripheral pulses. Neurological is normal, no focal findings. BREAST EXAM: breasts appear normal, no suspicious masses, no skin or nipple changes or axillary nodes    PELVIC EXAM: normal external genitalia, vulva, vagina, cervix, uterus and adnexa    Assessment/Plan:       ICD-10-CM ICD-9-CM    1. Well woman exam with routine gynecological exam  Z01.419 V72.31     [V72.31]   2. Screening for malignant neoplasm of cervix  Z12.4 V76.2 PAP IG, APTIMA HPV AND RFX 16/18,45 ([id number])   3. Mild intermittent asthma without complication  V04.17 161.80    4. Irregular periods/menstrual cycles: Stable on birth control. N92.6 626.4 CBC W/O DIFF      METABOLIC PANEL, COMPREHENSIVE      TSH 3RD GENERATION    now improved and regular    5. Sleep trouble: Some anxiety. Following psychiatrist and counselor. Will follow the recommendation E72.5 709.79 METABOLIC PANEL, COMPREHENSIVE   6. Anxiety: See above P31.4 611.11 METABOLIC PANEL, COMPREHENSIVE      TSH 3RD GENERATION   7.  Encounter for immunization  Z23 V03.89 PNEUMOCOCCAL POLYSACCHARIDE VACCINE, 23-VALENT, ADULT OR IMMUNOSUPPRESSED PT DOSE,   Patient understood and agree with above plan  Agreed to take pneumococcal vaccine due to history of asthma  She will think about tetanus shot. Does not recall when taken last time but she will think about it  She is aware that flu shot should be available soon  Follow-up and Dispositions    · Return in about 6 months (around 2/14/2021).      Jovita Negrete

## 2020-08-14 NOTE — PROGRESS NOTES
Chief Complaint   Patient presents with    Well Woman     w/ pap smear     1. Have you been to the ER, urgent care clinic since your last visit? Hospitalized since your last visit? No    2. Have you seen or consulted any other health care providers outside of the [address] [place] [address] since your last visit? Include any pap smears or colon screening.  No    Had pap approx 2015 but unsure where it was done at

## 2020-08-19 DIAGNOSIS — N76.0 BACTERIAL VAGINOSIS: Primary | ICD-10-CM

## 2020-08-19 DIAGNOSIS — B96.89 BACTERIAL VAGINOSIS: Primary | ICD-10-CM

## 2020-08-19 RX ORDER — METRONIDAZOLE 500 MG/1
500 TABLET ORAL 2 TIMES DAILY
Qty: 14 TAB | Refills: 0 | Status: SHIPPED | OUTPATIENT
Start: 2020-08-19 | End: 2020-08-26

## 2020-08-21 NOTE — PROGRESS NOTES
Contacted patient and verified identity using name and date of birth (2- identifiers)  Spoke with patient and she verbalized understanding of negative pap and BV shown. Patient to take Flagyl (already sent to pharmacy).  Further discussion at follow-up

## 2020-09-24 DIAGNOSIS — J30.89 NON-SEASONAL ALLERGIC RHINITIS DUE TO OTHER ALLERGIC TRIGGER: ICD-10-CM

## 2020-09-24 DIAGNOSIS — J45.50 POORLY CONTROLLED SEVERE PERSISTENT ASTHMA WITHOUT COMPLICATION: ICD-10-CM

## 2020-09-24 RX ORDER — BUDESONIDE AND FORMOTEROL FUMARATE DIHYDRATE 160; 4.5 UG/1; UG/1
2 AEROSOL RESPIRATORY (INHALATION) 2 TIMES DAILY
Qty: 1 INHALER | Refills: 5 | Status: SHIPPED | OUTPATIENT
Start: 2020-09-24 | End: 2022-06-08 | Stop reason: SDUPTHER

## 2021-01-20 ENCOUNTER — CLINICAL SUPPORT (OUTPATIENT)
Dept: FAMILY MEDICINE CLINIC | Age: 26
End: 2021-01-20
Payer: COMMERCIAL

## 2021-01-20 DIAGNOSIS — Z23 NEED FOR TDAP VACCINATION: Primary | ICD-10-CM

## 2021-01-20 PROCEDURE — 90715 TDAP VACCINE 7 YRS/> IM: CPT | Performed by: FAMILY MEDICINE

## 2021-01-20 PROCEDURE — 90471 IMMUNIZATION ADMIN: CPT | Performed by: FAMILY MEDICINE

## 2021-01-20 NOTE — PROGRESS NOTES
Chief Complaint   Patient presents with    Immunization/Injection     Tdap     Patient presents for the following vaccines: Tdap. Patient consent obtained by patient. Patient tolerated procedure well at right. Patient advised to remain in lobby for period of 10 minutes post injection to ensure no reaction.        Lot# [id number]  Exp: 11/07/22   MIGUEL ANGEL:SHANTEL  NDC: 61013-919-77

## 2021-01-20 NOTE — PATIENT INSTRUCTIONS
Tdap (Tetanus, Diphtheria, Pertussis) Vaccine: What You Need to Know Why get vaccinated? Tdap vaccine can prevent tetanus, diphtheria, and pertussis. Diphtheria and pertussis spread from person to person. Tetanus enters the body through cuts or wounds. · TETANUS (T) causes painful stiffening of the muscles. Tetanus can lead to serious health problems, including being unable to open the mouth, having trouble swallowing and breathing, or death. · DIPHTHERIA (D) can lead to difficulty breathing, heart failure, paralysis, or death. · PERTUSSIS (aP), also known as \"whooping cough,\" can cause uncontrollable, violent coughing which makes it hard to breathe, eat, or drink. Pertussis can be extremely serious in babies and young children, causing pneumonia, convulsions, brain damage, or death. In teens and adults, it can cause weight loss, loss of bladder control, passing out, and rib fractures from severe coughing. Tdap vaccine Tdap is only for children 7 years and older, adolescents, and adults. Adolescents should receive a single dose of Tdap, preferably at age 6 or 15 years. Pregnant women should get a dose of Tdap during every pregnancy, to protect the  from pertussis. Infants are most at risk for severe, life threatening complications from pertussis. Adults who have never received Tdap should get a dose of Tdap. Also, adults should receive a booster dose every 10 years, or earlier in the case of a severe and dirty wound or burn. Booster doses can be either Tdap or Td (a different vaccine that protects against tetanus and diphtheria but not pertussis). Tdap may be given at the same time as other vaccines. Talk with your health care provider Tell your vaccine provider if the person getting the vaccine: 
· Has had an allergic reaction after a previous dose of any vaccine that protects against tetanus, diphtheria, or pertussis, or has any severe, life threatening allergies. · Has had a coma, decreased level of consciousness, or prolonged seizures within 7 days after a previous dose of any pertussis vaccine (DTP, DTaP, or Tdap). · Has seizures or another nervous system problem. · Has ever had Guillain-Barré Syndrome (also called GBS). · Has had severe pain or swelling after a previous dose of any vaccine that protects against tetanus or diphtheria. In some cases, your health care provider may decide to postpone Tdap vaccination to a future visit. People with minor illnesses, such as a cold, may be vaccinated. People who are moderately or severely ill should usually wait until they recover before getting Tdap vaccine. Your health care provider can give you more information. Risks of a vaccine reaction · Pain, redness, or swelling where the shot was given, mild fever, headache, feeling tired, and nausea, vomiting, diarrhea, or stomachache sometimes happen after Tdap vaccine. People sometimes faint after medical procedures, including vaccination. Tell your provider if you feel dizzy or have vision changes or ringing in the ears. As with any medicine, there is a very remote chance of a vaccine causing a severe allergic reaction, other serious injury, or death. What if there is a serious problem? An allergic reaction could occur after the vaccinated person leaves the clinic. If you see signs of a severe allergic reaction (hives, swelling of the face and throat, difficulty breathing, a fast heartbeat, dizziness, or weakness), call 9-1-1 and get the person to the nearest hospital. 
For other signs that concern you, call your health care provider. Adverse reactions should be reported to the Vaccine Adverse Event Reporting System (VAERS). Your health care provider will usually file this report, or you can do it yourself. Visit the VAERS website at www.vaers. hhs.gov or call 8-980.353.9428. VAERS is only for reporting reactions, and VAERS staff do not give medical advice. The National Vaccine Injury Compensation Program 
The National Vaccine Injury Compensation Program (VICP) is a federal program that was created to compensate people who may have been injured by certain vaccines. Visit the VICP website at www.[facility].gov/vaccinecompensation or call 0-866.935.8922 to learn about the program and about filing a claim. There is a time limit to file a claim for compensation. How can I learn more? · Ask your health care provider. · Call your local or state health department. · Contact the Centers for Disease Control and Prevention (CDC): 
? Call 3-983.606.8021 (1-800-CDC-INFO) or 
? Visit CDC's website at www.cdc.gov/vaccines Vaccine Information Statement (Interim) Tdap (Tetanus, Diphtheria, Pertussis) Vaccine 04/01/2020 
42 U[address] Jesus Bryan [id number] [facility] and [employer] Centers for Disease Control and Prevention Many Vaccine Information Statements are available in Beninese and other languages. See www.immunize.org/vis. Muchas hojas de información sobre vacunas están disponibles en español y en otros idiomas. Visite www.immunize.org/vis. Care instructions adapted under license by [employer] (which disclaims liability or warranty for this information).  If you have questions about a medical condition or this instruction, always ask your healthcare professional. [address] any warranty or liability for your use of this information.

## 2021-02-08 DIAGNOSIS — N92.6 IRREGULAR PERIODS/MENSTRUAL CYCLES: ICD-10-CM

## 2021-02-08 RX ORDER — NORGESTIMATE AND ETHINYL ESTRADIOL 7DAYSX3 LO
1 KIT ORAL DAILY
Qty: 3 DOSE PACK | Refills: 4 | Status: SHIPPED | OUTPATIENT
Start: 2021-02-08 | End: 2022-03-06 | Stop reason: SDUPTHER

## 2021-02-08 NOTE — TELEPHONE ENCOUNTER
This pharmacy faxed over request for the following prescriptions to be filled:    Medication requested :   Requested Prescriptions     Pending Prescriptions Disp Refills    norgestimate-ethinyl estradioL (ORTHO TRI-CYCLEN LO) 0.18/0.215/0.25 mg-25 mcg tab 3 Dose Pack 4     Sig: Take 1 Tab by mouth daily.      PCP: Víctor Odonnell or Print: [facility]   Mail order or Local pharmacy [address] [place] [address]     Scheduled appointment if not seen by current providers in office:  LOV 8/14/2020 f/u 2/15/2021

## 2021-02-15 ENCOUNTER — VIRTUAL VISIT (OUTPATIENT)
Dept: FAMILY MEDICINE CLINIC | Age: 26
End: 2021-02-15
Payer: COMMERCIAL

## 2021-02-15 DIAGNOSIS — F90.0 ATTENTION DEFICIT HYPERACTIVITY DISORDER (ADHD), PREDOMINANTLY INATTENTIVE TYPE: ICD-10-CM

## 2021-02-15 DIAGNOSIS — R09.81 SINUS CONGESTION: ICD-10-CM

## 2021-02-15 DIAGNOSIS — F41.9 ANXIETY: ICD-10-CM

## 2021-02-15 DIAGNOSIS — G47.9 SLEEP TROUBLE: ICD-10-CM

## 2021-02-15 DIAGNOSIS — N92.6 IRREGULAR PERIODS/MENSTRUAL CYCLES: Primary | ICD-10-CM

## 2021-02-15 PROCEDURE — 99213 OFFICE O/P EST LOW 20 MIN: CPT | Performed by: FAMILY MEDICINE

## 2021-02-15 NOTE — PROGRESS NOTES
Joss Griffith is a 23 y.o. female who was seen by synchronous (real-time) audio-video technology on 2/15/2021 for Sinus Pain, Anxiety, and Asthma        Assessment & Plan:   Diagnoses and all orders for this visit:  :  Irregular periods/menstrual cycles: On birth control. Fairly stable    Attention deficit hyperactivity disorder (ADHD), predominantly inattentive type: Been following psychiatrist.  Fairly stable. Will follow the recommendation    Anxiety: On medication. Following psychiatrist.  Will follow the recommendation. No panic attacks. Fairly stable    Sleep trouble: Sleep hygiene. Sinus congestion: Currently following ENT. With the steroid and antibiotic. Completed them. Taking sinus rinse and symptomatic treatment for seasonal allergies. Has coming up appointment for follow-up in 2 weeks with ENT specialist.  Will follow the recommendation. Some cough probably due to postnasal drip. Will observe    Patient understood and agreed with the plan  She had completed her both Covid vaccine and flu shot    Please note that this dictation was completed with Take[employer], the computer voice recognition software. Quite often unanticipated grammatical, syntax, homophones, and other interpretive errors are inadvertently transcribed by the computer software. Please disregard these errors. Please excuse any errors that have escaped final proofreading. 712  Subjective:   Done visit through doxy  Here for follow-up  Currently having a sinus congestion, cough. Postnasal drip. Following ENT. Recently treated with a steroid and antibiotic. Completed them. No side effects. Also taking sinus rinse. Taking Flonase, add Allegra, Singulair. Will be following specialist recommendations. During visit sitting comfortable and did not appear in any acute distress. No headache or dizziness. No chest pain or shortness of breath. No palpitation or diaphoresis. No nausea vomiting or abdominal pain.   No urinary or bowel complaint. History of anxiety, sleep trouble. ADHD. Following psychiatrist and the recommendations. Currently taking Ativan at night almost daily. No panic attacks. Mood has been fairly stable. No thoughts of hurting herself or someone else. Will be following specialist recommendation. History of irregular menstrual cycle. Fairly stable on birth control. Lab Results   Component Value Date/Time    WBC 5.0 07/24/2019 12:00 AM    HGB 12.9 07/24/2019 12:00 AM    HCT 39.2 07/24/2019 12:00 AM    PLATELET 248 40/91/6807 12:00 AM    MCV 88 07/24/2019 12:00 AM         Prior to Admission medications    Medication Sig Start Date End Date Taking? Authorizing Provider   norgestimate-ethinyl estradioL (ORTHO TRI-CYCLEN LO) 0.18/0.215/0.25 mg-25 mcg tab Take 1 Tab by mouth daily. 2/8/21  Yes Cierra Siu MD   budesonide-formoteroL [facility]) 160-4.5 mcg/actuation HFAA Take 2 Puffs by inhalation two (2) times a day. 9/24/20  Yes Jenna Griffin MD   budesonide (PULMICORT) 0.5 mg/2 mL nbsp U 2 ML VIA NEB BID 12/19/19  Yes Provider, Historical   fexofenadine (ALLEGRA ALLERGY) 180 mg tablet Take 180 mg by mouth daily. Yes Provider, Historical   montelukast (SINGULAIR) 10 mg tablet Take 1 Tab by mouth daily. 1/15/20  Yes Jenna Griffin MD   umeclidinium (INCRUSE ELLIPTA) 62.5 mcg/actuation inhaler Take 1 Puff by inhalation daily. 1/15/20  Yes Jenna Griffin MD   inhalational spacing device (AEROCHAMBER Z-STAT PLUS) 1 Each by Does Not Apply route as needed for Wheezing (use with symbicort). Dx. J45.5 1/15/20  Yes Jenna Griffin MD   LORazepam (ATIVAN) 0.5 mg tablet Take 1 Tab by mouth daily as needed. 9/19/19  Yes Provider, Historical   acyclovir (ZOVIRAX) 400 mg tablet TAKE 1 TABLET BY MOUTH THREE TIMES DAILY 6/10/20   Cierra Siu MD   fluticasone propionate (FLONASE) 50 mcg/actuation nasal spray 1 spray each nostril daily as needed for nasal congestion.  2/5/20   Cierra Siu MD   mupirocin (BACTROBAN) 2 % ointment PLACE 1/2 [facility] OF OINTMENT INTO NETTI POT AND IRRAGATE QOD 11/19/19 2/15/21  Provider, Historical   ondansetron hcl (ZOFRAN) 4 mg tablet TK 1 T PO Q 8 H 11/13/19 2/15/21  Provider, Historical   dextroamphetamine-amphetamine (ADDERALL) 30 mg tablet Take 20 mg by mouth two (2) times a day. 9/19/19   Provider, Historical   albuterol (PROVENTIL VENTOLIN) 2.5 mg /3 mL (0.083 %) nebu 3 mL by Nebulization route every four (4) hours as needed (cough, chest tightness). 8/6/19   Shelly Zhou MD   albuterol (PROVENTIL HFA, VENTOLIN HFA, PROAIR HFA) 90 mcg/actuation inhaler Take 2 Puffs by inhalation every six (6) hours as needed for Wheezing or Shortness of Breath. 4/1/19   Shelly Zhou MD   ibuprofen (MOTRIN) 200 mg tablet Take  by mouth every six (6) hours as needed for Pain. Provider, Historical     Patient Active Problem List    Diagnosis Date Noted    Irregular periods/menstrual cycles 04/01/2019    H/O herpes genitalis 04/01/2019    Attention deficit disorder 04/01/2019    Asthma 03/18/2015     Current Outpatient Medications   Medication Sig Dispense Refill    norgestimate-ethinyl estradioL (ORTHO TRI-CYCLEN LO) 0.18/0.215/0.25 mg-25 mcg tab Take 1 Tab by mouth daily. 3 Dose Pack 4    budesonide-formoteroL (SYMBICORT) 160-4.5 mcg/actuation HFAA Take 2 Puffs by inhalation two (2) times a day. 1 Inhaler 5    budesonide (PULMICORT) 0.5 mg/2 mL nbsp U 2 ML VIA NEB BID      fexofenadine (ALLEGRA ALLERGY) 180 mg tablet Take 180 mg by mouth daily.  montelukast (SINGULAIR) 10 mg tablet Take 1 Tab by mouth daily. 90 Tab 3    umeclidinium (INCRUSE ELLIPTA) 62.5 mcg/actuation inhaler Take 1 Puff by inhalation daily. 3 Inhaler 3    inhalational spacing device (AEROCHAMBER Z-STAT PLUS) 1 Each by Does Not Apply route as needed for Wheezing (use with symbicort). Dx. J45.5 1 Device 11    LORazepam (ATIVAN) 0.5 mg tablet Take 1 Tab by mouth daily as needed.   0    acyclovir (ZOVIRAX) 400 mg tablet TAKE 1 TABLET BY MOUTH THREE TIMES DAILY 15 Tab 2    fluticasone propionate (FLONASE) 50 mcg/actuation nasal spray 1 spray each nostril daily as needed for nasal congestion. 1 Bottle 1    dextroamphetamine-amphetamine (ADDERALL) 30 mg tablet Take 20 mg by mouth two (2) times a day.  0    albuterol (PROVENTIL VENTOLIN) 2.5 mg /3 mL (0.083 %) nebu 3 mL by Nebulization route every four (4) hours as needed (cough, chest tightness). 30 Nebule 3    albuterol (PROVENTIL HFA, VENTOLIN HFA, PROAIR HFA) 90 mcg/actuation inhaler Take 2 Puffs by inhalation every six (6) hours as needed for Wheezing or Shortness of Breath. 1 Inhaler 3    ibuprofen (MOTRIN) 200 mg tablet Take  by mouth every six (6) hours as needed for Pain. Allergies   Allergen Reactions    Macrobid [Nitrofurantoin Monohyd/M-Cryst] Itching     Past Medical History:   Diagnosis Date    ADD (attention deficit disorder)     Asthma     Headache      Social History     Tobacco Use    Smoking status: Never Smoker    Smokeless tobacco: Never Used   Substance Use Topics    Alcohol use: Not Currently     Comment: rare       ROS: See HPI    Objective:   No flowsheet data found. General: alert, cooperative, no distress   Mental  status: normal mood, behavior, speech, dress, motor activity, and thought processes, able to follow commands   HENT: NCAT   Neck: no visualized mass   Resp: no respiratory distress   Neuro: no gross deficits   Skin: no discoloration or lesions of concern on visible areas   Psychiatric: normal affect, consistent with stated mood, no evidence of hallucinations     Additional exam findings: We discussed the expected course, resolution and complications of the diagnosis(es) in detail. Medication risks, benefits, costs, interactions, and alternatives were discussed as indicated. I advised her to contact the office if her condition worsens, changes or fails to improve as anticipated.  She expressed understanding with the diagnosis(es) and plan. Trevor Dugan, who was evaluated through a patient-initiated, synchronous (real-time) audio-video encounter, and/or her healthcare decision maker, is aware that it is a billable service, with coverage as determined by her insurance carrier. She provided verbal consent to proceed: Yes, and patient identification was verified. It was conducted pursuant to the emergency declaration under the [address] [place] [address] and the [employer] General Act. A caregiver was present when appropriate. Ability to conduct physical exam was limited. I was in the office. The patient was at home.       Alexis Goldberg MD

## 2021-03-30 ENCOUNTER — TELEPHONE (OUTPATIENT)
Dept: PULMONOLOGY | Age: 26
End: 2021-03-30

## 2021-03-30 RX ORDER — UMECLIDINIUM 62.5 UG/1
1 AEROSOL, POWDER ORAL DAILY
Qty: 3 INHALER | Refills: 0 | Status: SHIPPED | OUTPATIENT
Start: 2021-03-30 | End: 2022-07-28

## 2021-08-11 ENCOUNTER — HOSPITAL ENCOUNTER (OUTPATIENT)
Dept: LAB | Age: 26
Discharge: HOME | End: 2021-08-11
Payer: COMMERCIAL

## 2021-08-11 PROCEDURE — 87077 CULTURE AEROBIC IDENTIFY: CPT

## 2021-08-11 PROCEDURE — 87186 SC STD MICRODIL/AGAR DIL: CPT

## 2021-08-11 PROCEDURE — 87205 SMEAR GRAM STAIN: CPT

## 2021-08-11 PROCEDURE — 87075 CULTR BACTERIA EXCEPT BLOOD: CPT

## 2021-08-13 LAB
BACTERIA SPEC CULT: NORMAL
SERVICE CMNT-IMP: NORMAL

## 2021-08-14 LAB
BACTERIA SPEC CULT: ABNORMAL
BACTERIA SPEC CULT: ABNORMAL
GRAM STN SPEC: ABNORMAL
GRAM STN SPEC: ABNORMAL
SERVICE CMNT-IMP: ABNORMAL

## 2022-03-19 PROBLEM — N92.6 IRREGULAR PERIODS/MENSTRUAL CYCLES: Status: ACTIVE | Noted: 2019-04-01

## 2022-03-19 PROBLEM — F98.8 ATTENTION DEFICIT DISORDER: Status: ACTIVE | Noted: 2019-04-01

## 2022-03-19 PROBLEM — Z86.19 H/O HERPES GENITALIS: Status: ACTIVE | Noted: 2019-04-01

## 2022-04-20 ENCOUNTER — HOSPITAL ENCOUNTER (OUTPATIENT)
Dept: GENERAL RADIOLOGY | Age: 27
Discharge: HOME | End: 2022-04-20
Payer: COMMERCIAL

## 2022-04-20 ENCOUNTER — OFFICE VISIT (OUTPATIENT)
Dept: FAMILY MEDICINE CLINIC | Age: 27
End: 2022-04-20
Payer: COMMERCIAL

## 2022-04-20 VITALS
WEIGHT: 133.2 LBS | HEART RATE: 80 BPM | OXYGEN SATURATION: 98 % | RESPIRATION RATE: 16 BRPM | BODY MASS INDEX: 26.15 KG/M2 | DIASTOLIC BLOOD PRESSURE: 70 MMHG | SYSTOLIC BLOOD PRESSURE: 110 MMHG | HEIGHT: 60 IN | TEMPERATURE: 97.9 F

## 2022-04-20 DIAGNOSIS — R05.9 COUGH: ICD-10-CM

## 2022-04-20 DIAGNOSIS — Z01.419 WELL WOMAN EXAM WITH ROUTINE GYNECOLOGICAL EXAM: Primary | ICD-10-CM

## 2022-04-20 DIAGNOSIS — R06.2 WHEEZING: ICD-10-CM

## 2022-04-20 DIAGNOSIS — J45.20 INTERMITTENT ASTHMA WITHOUT COMPLICATION, UNSPECIFIED ASTHMA SEVERITY: ICD-10-CM

## 2022-04-20 PROCEDURE — 71046 X-RAY EXAM CHEST 2 VIEWS: CPT

## 2022-04-20 PROCEDURE — 99213 OFFICE O/P EST LOW 20 MIN: CPT | Performed by: FAMILY MEDICINE

## 2022-04-20 PROCEDURE — 99395 PREV VISIT EST AGE 18-39: CPT | Performed by: FAMILY MEDICINE

## 2022-04-20 RX ORDER — PREDNISONE 5 MG/1
TABLET ORAL
Qty: 21 TABLET | Refills: 0 | Status: SHIPPED | OUTPATIENT
Start: 2022-04-20 | End: 2022-06-08 | Stop reason: SDUPTHER

## 2022-04-20 RX ORDER — QUETIAPINE FUMARATE 50 MG/1
50 TABLET, FILM COATED ORAL
COMMUNITY
Start: 2022-04-06

## 2022-04-20 RX ORDER — METHYLPHENIDATE HYDROCHLORIDE 5 MG/1
5 TABLET ORAL 2 TIMES DAILY
COMMUNITY

## 2022-04-20 RX ORDER — METHYLPHENIDATE HYDROCHLORIDE 10 MG/1
10 TABLET ORAL 2 TIMES DAILY
COMMUNITY

## 2022-04-20 NOTE — PROGRESS NOTES
1. \"Have you been to the ER, urgent care clinic since your last visit? Hospitalized since your last visit? \" 11/19/21 [facility] ED  2. \"Have you seen or consulted any other health care providers outside of the [address] [place] [address] since your last visit? \" No  3. For patients aged 39-70: Has the patient had a colonoscopy / FIT/ Cologuard? No    If the patient is female:    4. For patients aged 41-77: Has the patient had a mammogram within the past 2 years? No    5. For patients aged 21-65: Has the patient had a pap smear?  8/14/2020    Chief Complaint   Patient presents with    Well Woman

## 2022-04-20 NOTE — PROGRESS NOTES
Subjective:   33 y.o. female for Well Woman Check. Last Pap smear in 2020. No prior history of abnormal Pap smear. No pelvic pain or vaginal discharge. Not sexually active currently. No prior history of STD. On and off self breast exam with no concern. No family history of breast cancer, cervical cancer, ovarian cancer or colon cancer. No menstrual abnormality. She has been complaining of cough for since 1 month. Not had any COVID test done. There are no sputum. On and off wheezing. Has a history of asthma. No fever. No nausea vomiting or abdominal pain. No chest pain or shortness of breath. Has tried albuterol but not much help. Also on Symbicort. Not seen pulmonary lately. During visit coughing but not using any extra respiratory muscle. Able to talk in full sentence. On auscultation wheezing present. She does get seasonal allergies. Getting allergy shots every week. Last one due next week. No urinary or bowel complaint. No headache or dizziness. Vitals been stable. Patient Active Problem List    Diagnosis Date Noted    Irregular periods/menstrual cycles 04/01/2019    H/O herpes genitalis 04/01/2019    Attention deficit disorder 04/01/2019    Asthma 03/18/2015     Current Outpatient Medications   Medication Sig Dispense Refill    methylphenidate HCl (RITALIN) 10 mg tablet Take 15 mg by mouth two (2) times a day.  methylphenidate HCl (Ritalin) 5 mg tablet       QUEtiapine (SEROquel) 50 mg tablet Take 50 mg by mouth nightly.  predniSONE (STERAPRED) 5 mg dose pack See administration instruction per 5mg dose pack 21 Tablet 0    norgestimate-ethinyl estradioL (ORTHO TRI-CYCLEN LO) 0.18/0.215/0.25 mg-25 mcg tab Take 1 Tablet by mouth daily. 3 Dose Pack 4    acyclovir (ZOVIRAX) 400 mg tablet TAKE 1 TABLET BY MOUTH THREE TIMES DAILY 15 Tablet 0    umeclidinium (Incruse Ellipta) 62.5 mcg/actuation inhaler Take 1 Puff by inhalation daily.  PATIENT NEEDS APPOINTMENT 3 Inhaler 0    budesonide-formoteroL (SYMBICORT) 160-4.5 mcg/actuation HFAA Take 2 Puffs by inhalation two (2) times a day. 1 Inhaler 5    budesonide (PULMICORT) 0.5 mg/2 mL nbsp U 2 ML VIA NEB BID      fexofenadine (ALLEGRA ALLERGY) 180 mg tablet Take 180 mg by mouth daily.  fluticasone propionate (FLONASE) 50 mcg/actuation nasal spray 1 spray each nostril daily as needed for nasal congestion. 1 Bottle 1    montelukast (SINGULAIR) 10 mg tablet Take 1 Tab by mouth daily. 90 Tab 3    inhalational spacing device (AEROCHAMBER Z-STAT PLUS) 1 Each by Does Not Apply route as needed for Wheezing (use with symbicort). Dx. J45.5 1 Device 11    albuterol (PROVENTIL VENTOLIN) 2.5 mg /3 mL (0.083 %) nebu 3 mL by Nebulization route every four (4) hours as needed (cough, chest tightness). 30 Nebule 3    albuterol (PROVENTIL HFA, VENTOLIN HFA, PROAIR HFA) 90 mcg/actuation inhaler Take 2 Puffs by inhalation every six (6) hours as needed for Wheezing or Shortness of Breath. 1 Inhaler 3    ibuprofen (MOTRIN) 200 mg tablet Take  by mouth every six (6) hours as needed for Pain. Allergies   Allergen Reactions    Macrobid [Nitrofurantoin Monohyd/M-Cryst] Itching     Past Medical History:   Diagnosis Date    ADD (attention deficit disorder)     Asthma     Headache      Past Surgical History:   Procedure Laterality Date    HX HEENT Right 2010    \"skin graft in ear drum\" tympanoplasty    IR INJ SINUS TRACT THERAP Bilateral 11/11/2019    Removal of sinus tissue to widen cavity.      Family History   Problem Relation Age of Onset    Psychiatric Disorder Mother     Hypertension Maternal Grandmother     Glaucoma Maternal Grandmother     Cancer Maternal Grandfather     Cancer Paternal Grandmother     Parkinson's Disease Paternal Grandfather      Social History     Tobacco Use    Smoking status: Never Smoker    Smokeless tobacco: Never Used   Substance Use Topics    Alcohol use: Not Currently     Comment: rare ROS: Feeling generally well. No TIA's or unusual headaches, no dysphagia.  + cough . No dyspnea or chest pain on exertion. + wheezing  No abdominal pain, change in bowel habits, black or bloody stools. No urinary tract symptoms. No new or unusual musculoskeletal symptoms. Specific concerns today: See HPI    Objective: The patient appears well, alert, oriented x 3, in no distress. Visit Vitals  /70 (BP 1 Location: Left upper arm, BP Patient Position: Sitting, BP Cuff Size: Adult)   Pulse 80   Temp 97.9 °F (36.6 °C) (Temporal)   Resp 16   Ht 5' (1.524 m)   Wt 133 lb 3.2 oz (60.4 kg)   LMP 04/02/2022   SpO2 98%   BMI 26.01 kg/m²     ENT normal.  Neck supple. No adenopathy or thyromegaly. ARINA. Lungs are clear, good air entry, + wheezes, - rhonchi or rales. S1 and S2 normal, no murmurs, regular rate and rhythm. Abdomen soft without tenderness, guarding, mass or organomegaly. Extremities show no edema, normal peripheral pulses. Neurological is normal, no focal findings. Pelvic exam deferred. Breast exam: bilaterally symmetrical, no palpable lump or abnormality. No axillary lymph nodes palpable bilaterally. No nipple pain or discharge bilaterally. Assessment/Plan:       ICD-10-CM ICD-9-CM    1. Well woman exam with routine gynecological exam  Z01.419 V72.31     [V72.31]   2. Cough  R05.9 786.2 XR CHEST PA LAT      predniSONE (STERAPRED) 5 mg dose pack   3. Intermittent asthma without complication, unspecified asthma severity: Cough and wheezing probably due to asthma exacerbation. Obtaining chest x-ray and giving oral prednisone. Continue albuterol and Symbicort. If no improvement will consider pulmonary follow-up J45.20 493.90 XR CHEST PA LAT      predniSONE (STERAPRED) 5 mg dose pack   4.  Wheezing  R06.2 786.07 XR CHEST PA LAT      predniSONE (STERAPRED) 5 mg dose pack   Pt understood and agree with the plan   Review    Follow-up and Dispositions    · Return in about 2 weeks (around 5/4/2022). Please note that this dictation was completed with [employer], the computer voice recognition software. Quite often unanticipated grammatical, syntax, homophones, and other interpretive errors are inadvertently transcribed by the computer software. Please disregard these errors. Please excuse any errors that have escaped final proofreading.

## 2022-04-20 NOTE — PATIENT INSTRUCTIONS
Asthma Attack: Care Instructions  Overview     During an asthma attack, the airways swell and narrow. This makes it hard to breathe. Severe asthma attacks can be dangerous. But you can help prevent these attacks by keeping your asthma under control and treating symptoms before they get bad. Symptoms include being short of breath, having chest tightness, coughing, and wheezing. Noting and treating these symptoms can also help you avoid trips to the emergency room. If you notice any problems or new symptoms, get medical treatment right away. Follow-up care is a key part of your treatment and safety. Be sure to make and go to all appointments, and call your doctor if you are having problems. It's also a good idea to know your test results and keep a list of the medicines you take. How can you care for yourself at home? · Follow your asthma action plan to prevent and treat attacks. If you don't have an asthma action plan, work with your doctor to create one. · Take your asthma medicines exactly as prescribed. Talk to your doctor right away if you have any questions about how to take them. ? Use your quick-relief medicine when you have symptoms of an asthma attack. Some people need to use quick-relief medicine before they exercise to prevent asthma symptoms. Albuterol is a quick-relief medicine that is often used. In some cases, a certain type of controller inhaler is used as a quick-relief medicine. Ask your doctor what to use for quick relief. ? Take your controller medicine. If you have symptoms often, you will likely need to take it every day. Controller medicine usually includes an inhaled corticosteroid. The goal is to prevent problems before they occur. ? If your doctor prescribed corticosteroid pills to use during an attack, take them exactly as prescribed. It may take hours for the pills to work, but they may make the episode shorter and help you breathe better. ?  Keep your quick-relief medicine with you at all times. · Talk to your doctor before using other medicines. Some medicines, such as aspirin, can cause asthma attacks in some people. · If you have a peak flow meter, use it to check how well you are breathing. This can help you predict when an asthma attack is going to occur. Then you can take medicine to prevent the asthma attack or make it less severe. · Do not smoke or allow others to smoke around you. Avoid smoky places. Smoking makes asthma worse. If you need help quitting, talk to your doctor about stop-smoking programs and medicines. These can increase your chances of quitting for good. · Learn what triggers an asthma attack for you, and avoid the triggers when you can. Common triggers include colds, smoke, air pollution, dust, pollen, mold, pets, cockroaches, stress, and cold air. · Avoid colds and the flu. Talk to your doctor about getting a pneumococcal vaccine shot. If you have had one before, ask your doctor if you need a second dose. Get a flu vaccine every fall. If you must be around people with colds or the flu, wash your hands often. When should you call for help? Call 911 anytime you think you may need emergency care. For example, call if:    · You have severe trouble breathing. Call your doctor now or seek immediate medical care if:    · Your symptoms do not get better after you have followed your asthma action plan.     · You have new or worse trouble breathing.     · Your coughing and wheezing get worse.     · You cough up dark brown or bloody mucus (sputum).     · You have a new or higher fever. Watch closely for changes in your health, and be sure to contact your doctor if:    · You need to use quick-relief medicine on more than 2 days a week within a month (unless it is just for exercise).     · You cough more deeply or more often, especially if you notice more mucus or a change in the color of your mucus.     · You are not getting better as expected.    Where can you learn more? Go to http://www.gray.com/  Enter S657 in the search box to learn more about \"Asthma Attack: Care Instructions. \"  Current as of: July 6, 2021               Content Version: 13.2  © 3302-7047 [employer]. Care instructions adapted under license by [employer] (which disclaims liability or warranty for this information). If you have questions about a medical condition or this instruction, always ask your healthcare professional. [address] any warranty or liability for your use of this information. Well Visit, Ages 25 to 48: Care Instructions  Overview     Well visits can help you stay healthy. Your doctor has checked your overall health and may have suggested ways to take good care of yourself. Your doctor also may have recommended tests. At home, you can help prevent illness with healthy eating, regular exercise, and other steps. Follow-up care is a key part of your treatment and safety. Be sure to make and go to all appointments, and call your doctor if you are having problems. It's also a good idea to know your test results and keep a list of the medicines you take. How can you care for yourself at home? · Get screening tests that you and your doctor decide on. Screening helps find diseases before any symptoms appear. · Eat healthy foods. Choose fruits, vegetables, whole grains, protein, and low-fat dairy foods. Limit fat, especially saturated fat. Reduce salt in your diet. · Limit alcohol. If you are a man, have no more than 2 drinks a day or 14 drinks a week. If you are a woman, have no more than 1 drink a day or 7 drinks a week. · Get at least 30 minutes of physical activity on most days of the week. Walking is a good choice. You also may want to do other activities, such as running, swimming, cycling, or playing tennis or team sports. Discuss any changes in your exercise program with your doctor.   · Reach and stay at a healthy weight. This will lower your risk for many problems, such as obesity, diabetes, heart disease, and high blood pressure. · Do not smoke or allow others to smoke around you. If you need help quitting, talk to your doctor about stop-smoking programs and medicines. These can increase your chances of quitting for good. · Care for your mental health. It is easy to get weighed down by worry and stress. Learn strategies to manage stress, like deep breathing and mindfulness, and stay connected with your family and community. If you find you often feel sad or hopeless, talk with your doctor. Treatment can help. · Talk to your doctor about whether you have any risk factors for sexually transmitted infections (STIs). You can help prevent STIs if you wait to have sex with a new partner (or partners) until you've each been tested for STIs. It also helps if you use condoms (male or female condoms) and if you limit your sex partners to one person who only has sex with you. Vaccines are available for some STIs, such as HPV. · Use birth control if it's important to you to prevent pregnancy. Talk with your doctor about the choices available and what might be best for you. · If you think you may have a problem with alcohol or drug use, talk to your doctor. This includes prescription medicines (such as amphetamines and opioids) and illegal drugs (such as cocaine and methamphetamine). Your doctor can help you figure out what type of treatment is best for you. · Protect your skin from too much sun. When you're outdoors from 10 a.m. to 4 p.m., stay in the shade or cover up with clothing and a hat with a wide brim. Wear sunglasses that block UV rays. Even when it's cloudy, put broad-spectrum sunscreen (SPF 30 or higher) on any exposed skin. · See a dentist one or two times a year for checkups and to have your teeth cleaned. · Wear a seat belt in the car. When should you call for help?   Watch closely for changes in your health, and be sure to contact your doctor if you have any problems or symptoms that concern you. Where can you learn more? Go to http://www.[employer].com/  Enter P072 in the search box to learn more about \"Well Visit, Ages 25 to 48: Care Instructions. \"  Current as of: October 6, 2021               Content Version: 13.2  © 2684-4137 [employer]. Care instructions adapted under license by [employer] (which disclaims liability or warranty for this information). If you have questions about a medical condition or this instruction, always ask your healthcare professional. [place] [address] any warranty or liability for your use of this information.

## 2022-04-22 NOTE — PROGRESS NOTES
No acute findings. For now I would also advise to make a follow up appt with pulmonary to discuss further her on going symptoms. Keep follow up appt with me.

## 2022-06-08 ENCOUNTER — TELEPHONE (OUTPATIENT)
Dept: FAMILY MEDICINE CLINIC | Age: 27
End: 2022-06-08

## 2022-06-08 ENCOUNTER — VIRTUAL VISIT (OUTPATIENT)
Dept: FAMILY MEDICINE CLINIC | Age: 27
End: 2022-06-08

## 2022-06-08 DIAGNOSIS — J30.89 NON-SEASONAL ALLERGIC RHINITIS DUE TO OTHER ALLERGIC TRIGGER: ICD-10-CM

## 2022-06-08 DIAGNOSIS — R05.9 COUGH: ICD-10-CM

## 2022-06-08 DIAGNOSIS — R05.8 DRY COUGH: Primary | ICD-10-CM

## 2022-06-08 DIAGNOSIS — J45.31 MILD PERSISTENT ASTHMA WITH EXACERBATION: ICD-10-CM

## 2022-06-08 DIAGNOSIS — J45.20 INTERMITTENT ASTHMA WITHOUT COMPLICATION, UNSPECIFIED ASTHMA SEVERITY: ICD-10-CM

## 2022-06-08 DIAGNOSIS — R06.2 WHEEZING: ICD-10-CM

## 2022-06-08 DIAGNOSIS — J45.50 POORLY CONTROLLED SEVERE PERSISTENT ASTHMA WITHOUT COMPLICATION: ICD-10-CM

## 2022-06-08 DIAGNOSIS — R09.81 NASAL CONGESTION: ICD-10-CM

## 2022-06-08 PROCEDURE — 99213 OFFICE O/P EST LOW 20 MIN: CPT | Performed by: FAMILY MEDICINE

## 2022-06-08 RX ORDER — BUDESONIDE AND FORMOTEROL FUMARATE DIHYDRATE 160; 4.5 UG/1; UG/1
2 AEROSOL RESPIRATORY (INHALATION) 2 TIMES DAILY
Qty: 1 EACH | Refills: 1 | Status: CANCELLED | OUTPATIENT
Start: 2022-06-08

## 2022-06-08 RX ORDER — ALBUTEROL SULFATE 90 UG/1
2 AEROSOL, METERED RESPIRATORY (INHALATION)
Qty: 1 EACH | Refills: 0 | Status: SHIPPED | OUTPATIENT
Start: 2022-06-08

## 2022-06-08 RX ORDER — MUPIROCIN 20 MG/G
OINTMENT TOPICAL
COMMUNITY
Start: 2021-08-11

## 2022-06-08 RX ORDER — LORAZEPAM 1 MG/1
1 TABLET ORAL DAILY
COMMUNITY
Start: 2022-05-04

## 2022-06-08 RX ORDER — ALBUTEROL SULFATE 0.83 MG/ML
2.5 SOLUTION RESPIRATORY (INHALATION)
Qty: 30 NEBULE | Refills: 3 | Status: SHIPPED | OUTPATIENT
Start: 2022-06-08

## 2022-06-08 RX ORDER — BUDESONIDE AND FORMOTEROL FUMARATE DIHYDRATE 160; 4.5 UG/1; UG/1
2 AEROSOL RESPIRATORY (INHALATION) 2 TIMES DAILY
Qty: 1 EACH | Refills: 1 | Status: SHIPPED | OUTPATIENT
Start: 2022-06-08 | End: 2022-07-28

## 2022-06-08 RX ORDER — PREDNISONE 5 MG/1
TABLET ORAL
Qty: 21 TABLET | Refills: 0 | Status: SHIPPED | OUTPATIENT
Start: 2022-06-08 | End: 2022-07-28

## 2022-06-08 RX ORDER — BUDESONIDE AND FORMOTEROL FUMARATE DIHYDRATE 160; 4.5 UG/1; UG/1
2 AEROSOL RESPIRATORY (INHALATION) 2 TIMES DAILY
Status: CANCELLED | OUTPATIENT
Start: 2022-06-08

## 2022-06-08 NOTE — TELEPHONE ENCOUNTER
Fax received from [address] [place] [address] my meds stating prior auth is required for the budesonide-formoteroL (SYMBICORT) 160-4.5 mcg/actuation HFAA    . Submit a PA request  1. Go to key. [email or web address] and click \"Enter a Key\"  2. Patient last name: Tiki Acevedo      : 1995      Key: [id number]  3.  Click \"start a PA\", complete the form, and \"send to plan\"

## 2022-06-08 NOTE — PROGRESS NOTES
Inocente Moreiar is a 33 y.o. female who was seen by synchronous (real-time) audio-video technology on 6/8/2022 for Cough, Asthma, Wheezing, and Medication Refill (Symbicort Inhaler 160-4.5 mcg is not covered by patient's insurance - Please choose an alternative medication)        Assessment & Plan:   Diagnoses and all orders for this visit:    Dry cough: Going on since last couple of months. Since last 2 weeks is getting worse on and off. She has ran out of Symbicort since 1 month so not taking it. History of asthma. More concerned with the nasal congestion and dry cough. Some sinus pressure. No nasal discharge. Has been following ENT for allergies as well. On symptomatic treatment for seasonal allergies. No GERD symptoms. Cough is more worse during the evening time and during nighttime. No fever. No sick contact. Said on and off and at work she does get exposed with the COVID-positive people. She is having a sore throat since couple of days. Advised to do the home COVID test.  She is fully vaccinated with the booster dose. No nausea vomiting or abdominal pain. No appetite or weight change. No urinary or bowel complaint. No headache or dizziness. No chest pain, palpitation or diaphoresis. She does feel some heavy breathing but no shortness of breath. Mild chest congestion. No wheezing. Able to talk in full sentence. Not using any extra respiratory muscle. Advised to make a sooner appointment with pulmonary if no improvement in symptoms. Currently restarting Symbicort and giving oral prednisone for symptomatic treatment. Intermittent asthma without complication, unspecified asthma severity  -     predniSONE (STERAPRED) 5 mg dose pack; See administration instruction per 5mg dose pack, Normal, Disp-21 Tablet, R-0    Mild persistent asthma with exacerbation  -     albuterol (PROVENTIL HFA, VENTOLIN HFA, PROAIR HFA) 90 mcg/actuation inhaler;  Take 2 Puffs by inhalation every six (6) hours as needed for Wheezing or Shortness of Breath., Normal, Disp-1 Each, R-0  -     albuterol (PROVENTIL VENTOLIN) 2.5 mg /3 mL (0.083 %) nebu; 3 mL by Nebulization route every four (4) hours as needed (cough, chest tightness). , Normal, Disp-30 Nebule, R-3    Poorly controlled severe persistent asthma without complication  -     budesonide-formoteroL (SYMBICORT) 160-4.5 mcg/actuation HFAA; Take 2 Puffs by inhalation two (2) times a day., Normal, Disp-1 Each, R-1    Non-seasonal allergic rhinitis due to other allergic trigger  -     budesonide-formoteroL (SYMBICORT) 160-4.5 mcg/actuation HFAA; Take 2 Puffs by inhalation two (2) times a day., Normal, Disp-1 Each, R-1    Nasal congestion    Cough  -     predniSONE (STERAPRED) 5 mg dose pack; See administration instruction per 5mg dose pack, Normal, Disp-21 Tablet, R-0    Wheezing  -     predniSONE (STERAPRED) 5 mg dose pack; See administration instruction per 5mg dose pack, Normal, Disp-21 Tablet, R-0    Other orders  -     AIRBORNE ISOLATION; Standing  -     CONTACT ISOLATION; Standing  -     DROPLET ISOLATION; Standing  -     DROPLET PLUS; Standing  -     ENTERIC CONTACT ISOLATION; Standing  -     AIRBORNE ISOLATION  -     CONTACT ISOLATION  -     DROPLET ISOLATION  -     DROPLET PLUS  -     ENTERIC CONTACT ISOLATION    Patient understood agreed with the plan  Follow-up in 2 to 3 weeks. 712  Subjective:   [facility]  Concern with having dry cough, nasal congestion, chest congestion since last couple of months. Chest x-ray back in April was negative. She has taken prednisone at that time which helped minimally. She is not taking Symbicort since 1 month as she ran out. No audible wheezing. No chest pain or any chest tightness. Not using any extra respiratory muscle. No audible wheezing. Using albuterol as needed. Using prednisone with the nasal rinse. Does have history of seasonal allergy and following ENT for it.   Did not appear in any acute distress but frustrated due to ongoing symptoms. No GERD symptoms. Cough is more at nighttime. No nasal discharge or any sputum production. Prior to Admission medications    Medication Sig Start Date End Date Taking? Authorizing Provider   albuterol (PROVENTIL HFA, VENTOLIN HFA, PROAIR HFA) 90 mcg/actuation inhaler Take 2 Puffs by inhalation every six (6) hours as needed for Wheezing or Shortness of Breath. 6/8/22  Yes Aiden Bryant MD   albuterol (PROVENTIL VENTOLIN) 2.5 mg /3 mL (0.083 %) nebu 3 mL by Nebulization route every four (4) hours as needed (cough, chest tightness). 6/8/22  Yes MD kayy Thomaspirocin [facility]) 2 % ointment apply by topical route 1/2 inch to benito med rinse bottle, rinse bilateral nares BID everyday 8/11/21  Yes Provider, Historical   LORazepam (ATIVAN) 1 mg tablet TAKE ONE TABLET BY MOUTH DAILY AS NEEDED FOR PANIC ATTACKS 5/4/22  Yes Provider, Historical   predniSONE (STERAPRED) 5 mg dose pack See administration instruction per 5mg dose pack 6/8/22  Yes Aiden Bryant MD   budesonide-formoteroL (SYMBICORT) 160-4.5 mcg/actuation HFAA Take 2 Puffs by inhalation two (2) times a day. 6/8/22  Yes Aiden Bryant MD   methylphenidate HCl (RITALIN) 10 mg tablet Take 15 mg by mouth two (2) times a day. Yes Provider, Historical   methylphenidate HCl (Ritalin) 5 mg tablet    Yes Provider, Historical   QUEtiapine (SEROquel) 50 mg tablet Take 50 mg by mouth nightly. 4/6/22  Yes Provider, Historical   norgestimate-ethinyl estradioL (ORTHO TRI-CYCLEN LO) 0.18/0.215/0.25 mg-25 mcg tab Take 1 Tablet by mouth daily. 3/7/22  Yes Aiden Bryant MD   acyclovir (ZOVIRAX) 400 mg tablet TAKE 1 TABLET BY MOUTH THREE TIMES DAILY 9/27/21  Yes Aiden Bryant MD   umeclidinium (Incruse Ellipta) 62.5 mcg/actuation inhaler Take 1 Puff by inhalation daily.  PATIENT NEEDS APPOINTMENT 3/30/21  Yes Idris Godinez MD   budesonide (PULMICORT) 0.5 mg/2 mL nbsp U 2 ML VIA NEB BID 12/19/19  Yes Provider, Historical   fexofenadine (ALLEGRA ALLERGY) 180 mg tablet Take 180 mg by mouth daily. Yes Provider, Historical   fluticasone propionate (FLONASE) 50 mcg/actuation nasal spray 1 spray each nostril daily as needed for nasal congestion. 2/5/20  Yes Kylah Quintero MD   montelukast (SINGULAIR) 10 mg tablet Take 1 Tab by mouth daily. 1/15/20  Yes Miguel A Pisano MD   inhalational spacing device (AEROCHAMBER Z-STAT PLUS) 1 Each by Does Not Apply route as needed for Wheezing (use with symbicort). Dx. J45.5 1/15/20  Yes Miguel A Pisano MD   ibuprofen (MOTRIN) 200 mg tablet Take  by mouth every six (6) hours as needed for Pain. Yes Provider, Historical   predniSONE (STERAPRED) 5 mg dose pack See administration instruction per 5mg dose pack  Patient not taking: Reported on 6/8/2022 4/20/22 6/8/22  Kylah Quintero MD   budesonide-formoteroL [facility]) 160-4.5 mcg/actuation HFAA Take 2 Puffs by inhalation two (2) times a day. Patient not taking: Reported on 6/8/2022 9/24/20 6/8/22  Miguel A Pisano MD   albuterol (PROVENTIL VENTOLIN) 2.5 mg /3 mL (0.083 %) nebu 3 mL by Nebulization route every four (4) hours as needed (cough, chest tightness). 8/6/19 6/8/22  Kylah Quintero MD   albuterol (PROVENTIL HFA, VENTOLIN HFA, PROAIR HFA) 90 mcg/actuation inhaler Take 2 Puffs by inhalation every six (6) hours as needed for Wheezing or Shortness of Breath.  4/1/19 6/8/22  Kylah Quintero MD     Patient Active Problem List    Diagnosis Date Noted    Irregular periods/menstrual cycles 04/01/2019    H/O herpes genitalis 04/01/2019    Attention deficit disorder 04/01/2019    Asthma 03/18/2015       ROS: See HPI    Objective:     Patient-Reported Vitals 6/8/2022   Patient-Reported LMP 5/28/2022      General: alert, cooperative, no distress   Mental  status: normal mood, behavior, speech, dress, motor activity, and thought processes, able to follow commands   HENT: NCAT   Neck: no visualized mass   Resp: no respiratory distress   Neuro: no gross deficits   Skin: no discoloration or lesions of concern on visible areas   Psychiatric: normal affect, consistent with stated mood, no evidence of hallucinations     Additional exam findings: We discussed the expected course, resolution and complications of the diagnosis(es) in detail. Medication risks, benefits, costs, interactions, and alternatives were discussed as indicated. I advised her to contact the office if her condition worsens, changes or fails to improve as anticipated. She expressed understanding with the diagnosis(es) and plan. Shasha Neno, was evaluated through a synchronous (real-time) audio-video encounter. The patient (or guardian if applicable) is aware that this is a billable service, which includes applicable co-pays. This Virtual Visit was conducted with patient's (and/or legal guardian's) consent. The visit was conducted pursuant to the emergency declaration under the [address] [place] [address] authority and the [employer] General Act. Patient identification was verified, and a caregiver was present when appropriate.   The patient was located at: Home: [address] [place] [id number]  The provider was located at: Home:         Valdo Morgan MD

## 2022-06-08 NOTE — PROGRESS NOTES
1. Have you been to the ER, urgent care clinic since your last visit? Hospitalized since your last visit? No    2. Have you seen or consulted any other health care providers outside of the [address] [place] [address] since your last visit? Include any pap smears or colon screening.  No      Chief Complaint   Patient presents with    Cough    Asthma    Wheezing

## 2022-06-17 NOTE — TELEPHONE ENCOUNTER
Attempted PA via covermymeds website. Patient's insurance will cover BRAND NAME Symbicort but not generic Budesonide-Formoterol. Sumit Jones @ [facility] [address] Varner [phone] to run the claim for the brand name Symbicort for coverage. Pharmacy asked to contact [facility] if there are any further issues.

## 2022-07-19 PROBLEM — J30.9 ALLERGIC RHINITIS: Status: ACTIVE | Noted: 2021-03-18

## 2022-07-19 RX ORDER — ONDANSETRON 4 MG/1
4 TABLET, FILM COATED ORAL
COMMUNITY
Start: 2021-11-19 | End: 2022-09-19

## 2022-07-28 ENCOUNTER — OFFICE VISIT (OUTPATIENT)
Dept: PULMONOLOGY | Age: 27
End: 2022-07-28
Payer: COMMERCIAL

## 2022-07-28 ENCOUNTER — HOSPITAL ENCOUNTER (OUTPATIENT)
Dept: LAB | Age: 27
Discharge: HOME | End: 2022-07-28

## 2022-07-28 VITALS
HEIGHT: 60 IN | TEMPERATURE: 97.5 F | DIASTOLIC BLOOD PRESSURE: 80 MMHG | WEIGHT: 142.4 LBS | BODY MASS INDEX: 27.96 KG/M2 | RESPIRATION RATE: 16 BRPM | HEART RATE: 85 BPM | OXYGEN SATURATION: 98 % | SYSTOLIC BLOOD PRESSURE: 121 MMHG

## 2022-07-28 DIAGNOSIS — R06.09 DYSPNEA ON EXERTION: ICD-10-CM

## 2022-07-28 DIAGNOSIS — J45.50 POORLY CONTROLLED SEVERE PERSISTENT ASTHMA WITHOUT COMPLICATION: ICD-10-CM

## 2022-07-28 DIAGNOSIS — J40 BRONCHITIS: Primary | ICD-10-CM

## 2022-07-28 DIAGNOSIS — J31.0 CHRONIC RHINITIS: ICD-10-CM

## 2022-07-28 LAB — XX-LABCORP SPECIMEN COL,LCBCF: NORMAL

## 2022-07-28 PROCEDURE — 99001 SPECIMEN HANDLING PT-LAB: CPT

## 2022-07-28 PROCEDURE — 99215 OFFICE O/P EST HI 40 MIN: CPT | Performed by: INTERNAL MEDICINE

## 2022-07-28 RX ORDER — BUDESONIDE, GLYCOPYRROLATE, AND FORMOTEROL FUMARATE 160; 9; 4.8 UG/1; UG/1; UG/1
2 AEROSOL, METERED RESPIRATORY (INHALATION) 2 TIMES DAILY
Qty: 1 EACH | Refills: 11 | Status: SHIPPED | OUTPATIENT
Start: 2022-07-28

## 2022-07-28 RX ORDER — PREDNISONE 10 MG/1
TABLET ORAL
Qty: 74 TABLET | Refills: 0 | Status: SHIPPED | OUTPATIENT
Start: 2022-07-28 | End: 2022-09-01

## 2022-07-28 RX ORDER — BUDESONIDE, GLYCOPYRROLATE, AND FORMOTEROL FUMARATE 160; 9; 4.8 UG/1; UG/1; UG/1
2 AEROSOL, METERED RESPIRATORY (INHALATION) 2 TIMES DAILY
Qty: 1 EACH | Refills: 0 | Status: SHIPPED | COMMUNITY
Start: 2022-07-28 | End: 2022-09-19 | Stop reason: SDUPTHER

## 2022-07-28 RX ORDER — OMEPRAZOLE 40 MG/1
40 CAPSULE, DELAYED RELEASE ORAL DAILY
Qty: 35 CAPSULE | Refills: 0 | Status: SHIPPED | OUTPATIENT
Start: 2022-07-28 | End: 2022-09-01

## 2022-07-28 RX ORDER — AZITHROMYCIN 500 MG/1
500 TABLET, FILM COATED ORAL DAILY
Qty: 5 TABLET | Refills: 0 | Status: SHIPPED | OUTPATIENT
Start: 2022-07-28 | End: 2022-08-02

## 2022-07-28 NOTE — PROGRESS NOTES
[address] [place], [place] [address] [phone]    [address] [place] [address] [facility]  Pulmonary, Critical Care, and Sleep Medicine    Pulmonary Office Followup  Name: Nemesio Tsang 33 y.o. female  MRN: [id number]  : 1995  Service Date: 22  Chief Complaint:   Chief Complaint   Patient presents with    Cough with sputum    Results     CXR 2022    Wheezing    Shortness of Breath     With Activity          History of Present Illness:  Nemesio Tsang is a 33 y.o. female, who presents to Pulmonary clinic for followup of asthma. Patient was last seen in our clinic on 1/15/2020. Increased cough and dyspnea over the last 2 months -- worse when bending over, which brings up sputum  Intermittently productive  ENT said pt had sinus infection -- given ABx and then steroids. Reports no improvement to symptoms. Pt using inhalers intermittently, using maybe about a month ago, used ICS/LABA for a few days, felt worse and then stopped again. Dyspnea has been worsening -- pushing stretchers, moving a lot  Works as an EMT and ER tech at [employer] allergy shots in February -- did for over a year with no improvement, so she stopped. No relief with PRN albuterol neb    Past Medical History:   Diagnosis Date    ADD (attention deficit disorder)     Asthma     Headache      Past Surgical History:   Procedure Laterality Date    HX HEENT Right     \"skin graft in ear drum\" tympanoplasty    IR INJ SINUS TRACT THERAP Bilateral 2019    Removal of sinus tissue to widen cavity.      Family History   Problem Relation Age of Onset    Psychiatric Disorder Mother     Hypertension Maternal Grandmother     Glaucoma Maternal Grandmother     Cancer Maternal Grandfather     Cancer Paternal Grandmother     Parkinson's Disease Paternal Grandfather      Social History     Socioeconomic History    Marital status: SINGLE     Spouse name: Not on file    Number of children: Not on file    Years of education: Not on file    Highest education level: Not on file   Occupational History    Not on file   Tobacco Use    Smoking status: Never    Smokeless tobacco: Never   Substance and Sexual Activity    Alcohol use: Not Currently     Comment: rare    Drug use: No    Sexual activity: Not Currently     Partners: Male   Other Topics Concern    Not on file   Social History Narrative    Not on file     Social Determinants of Health     Financial Resource Strain: Not on file   Food Insecurity: Not on file   Transportation Needs: Not on file   Physical Activity: Not on file   Stress: Not on file   Social Connections: Not on file   Intimate Partner Violence: Not on file   Housing Stability: Not on file     Allergies   Allergen Reactions    Macrobid [Nitrofurantoin Monohyd/M-Cryst] Itching     Prior to Admission medications    Medication Sig Start Date End Date Taking? Authorizing Provider   ondansetron hcl (ZOFRAN) 4 mg tablet Take 4 mg by mouth. 11/19/21  Yes Provider, Historical   albuterol (PROVENTIL HFA, VENTOLIN HFA, PROAIR HFA) 90 mcg/actuation inhaler Take 2 Puffs by inhalation every six (6) hours as needed for Wheezing or Shortness of Breath. 6/8/22   Christ Denton MD   albuterol (PROVENTIL VENTOLIN) 2.5 mg /3 mL (0.083 %) nebu 3 mL by Nebulization route every four (4) hours as needed (cough, chest tightness). 6/8/22   Christ Denton MD   mupirocin Jakub Massey) 2 % ointment apply by topical route 1/2 inch to benito med rinse bottle, rinse bilateral nares BID everyday 8/11/21   Provider, Historical   LORazepam (ATIVAN) 1 mg tablet TAKE ONE TABLET BY MOUTH DAILY AS NEEDED FOR PANIC ATTACKS 5/4/22   Provider, Historical   predniSONE (STERAPRED) 5 mg dose pack See administration instruction per 5mg dose pack 6/8/22   Christ Denton MD   budesonide-formoteroL [facility]) 160-4.5 mcg/actuation HFAA Take 2 Puffs by inhalation two (2) times a day.  6/8/22   Christ Denton MD   methylphenidate HCl (RITALIN) 10 mg tablet Take 15 mg by mouth two (2) times a day. Provider, Historical   methylphenidate HCl (Ritalin) 5 mg tablet     Provider, Historical   QUEtiapine (SEROquel) 50 mg tablet Take 50 mg by mouth nightly. 4/6/22   Provider, Historical   norgestimate-ethinyl estradioL (ORTHO TRI-CYCLEN LO) 0.18/0.215/0.25 mg-25 mcg tab Take 1 Tablet by mouth daily. 3/7/22   Chel Jose MD   acyclovir (ZOVIRAX) 400 mg tablet TAKE 1 TABLET BY MOUTH THREE TIMES DAILY 9/27/21   Chel Jose MD   umeclidinium (Incruse Ellipta) 62.5 mcg/actuation inhaler Take 1 Puff by inhalation daily. PATIENT NEEDS APPOINTMENT 3/30/21   Apryl Diaz MD   budesonide (PULMICORT) 0.5 mg/2 mL nbsp U 2 ML VIA NEB BID 12/19/19   Provider, Historical   fexofenadine (ALLEGRA ALLERGY) 180 mg tablet Take 180 mg by mouth daily. Provider, Historical   fluticasone propionate (FLONASE) 50 mcg/actuation nasal spray 1 spray each nostril daily as needed for nasal congestion. 2/5/20   Chel Jose MD   montelukast (SINGULAIR) 10 mg tablet Take 1 Tab by mouth daily. 1/15/20   Apryl Diaz MD   inhalational spacing device (AEROCHAMBER Z-STAT PLUS) 1 Each by Does Not Apply route as needed for Wheezing (use with symbicort). Dx. J45.5 1/15/20   Apryl Diaz MD   ibuprofen (MOTRIN) 200 mg tablet Take  by mouth every six (6) hours as needed for Pain.     Provider, Historical     Immunization History   Administered Date(s) Administered    COVID-19, MODERNA BLUE border, Primary or Immunocompromised, (age 18y+), IM, 100 mcg/0.5mL 01/08/2021, 02/05/2021, 12/08/2021    HPV (9-valent) 12/12/2017, 02/12/2018    HPV (Quad) 06/14/2018    Influenza Vaccine (Quad) PF (>6 Mo Flulaval, Fluarix, and >3 Yrs Afluria, Fluzone 62962) 08/22/2018, 08/26/2019, 12/21/2020, 09/29/2021    Pneumococcal Polysaccharide (PPSV-23) 08/14/2020    Tdap 01/20/2021       Review of Systems:  A complete review of systems was performed as stated in the HPI, all others are negative. Objective:    Physical Exam:  /80 (BP 1 Location: Left upper arm, BP Patient Position: Sitting, BP Cuff Size: Adult)   Pulse 85   Temp 97.5 °F (36.4 °C) (Oral)   Resp 16   Ht 5' (1.524 m)   Wt 64.6 kg (142 lb 6.4 oz)   SpO2 98% Comment: RA Rest  BMI 27.81 kg/m²   Vitals were personally reviewed  Gen: no acute distress, pleasant and cooperative, sitting up in chair, ambulates without difficulty  HEENT: normocephalic/atraumatic, no ocular drainage, EOMI, no scleral icterus, nasal bridge midline, unable to assess nasal and oral cavities due to patient wearing mask in the setting of COVID-19 pandemic  CVS: regular rate rhythm, S1/S2, no murmurs/rubs/gallops  Lungs: Poor air entry B/L, a few scattered rhonchi, no wheezes/rales  Psych: normal memory, thought content, and processing    Labs: I have reviewed the patient's available labs  --  Lab Results   Component Value Date/Time    WBC 5.0 07/24/2019 12:00 AM    HGB 12.9 07/24/2019 12:00 AM    HCT 39.2 07/24/2019 12:00 AM    PLATELET 886 92/20/4444 12:00 AM    MCV 88 07/24/2019 12:00 AM     Lab Results   Component Value Date/Time    Immunoglobulin E 9 07/24/2019 12:00 AM       Imaging:  I have personally reviewed patient's imaging --chest x-ray PA and lateral from 4/20/2022 shows clear lung fields bilaterally without infiltrate, mass, effusion, consolidation. Official report per radiology:  XR Results (most recent):  Results from [place] encounter on 04/20/22    XR CHEST PA LAT    Narrative  EXAM: XR CHEST PA LAT    CLINICAL INDICATION/HISTORY: 27 years Female. Cough for 2 months. History of  asthma. .  Additional History: None    TECHNIQUE: Frontal and lateral views of the chest    COMPARISON: 8/6/2019    FINDINGS:    Pulmonary hyperinflation. The cardiac silhouette appears within normal limits. Pulmonary vasculature appears within normal limits. No confluent airspace opacity is appreciated.     No evidence of pleural effusion or pneumothorax. No acute osseous abnormality appreciated. Impression  No radiographic evidence of acute cardiopulmonary process. Pulmonary hyperinflation, which can be seen with deep inspiratory effort or  obstructive airway disease. PFTs: 7/24/2019--spirometry is normal, but shows a reduced FEV1 at 75%. No BD response. TTE:  I have reviewed the patient's TTE results  No results found for this or any previous visit. Assessment and Plan:  33 y.o. female with:    Impression:  1. Acute bronchitis  2. Poorly controlled severe persistent asthma with exacerbation: Patient reporting worsening symptoms of cough and dyspnea over the last few months. Not using controller inhalers. Off of SCIT therapy since February  3. Chronic rhinitis  4. Chronic cough: Etiology multifactorial, due to above  5. 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 6.  Distant exertion/shortness of breath: Likely due to above    Plan:  -Discussed symptoms with patient, advised that we will treat bronchitis more aggressively. With regards to dyspnea, once given trial of therapy, we will consider CPET at next visit to better delineate her symptoms  -CT chest without contrast to rule out asthma mimics such as ABPA  -TTE  -CBC with differential, total IgE, regional allergy panel, ANCA panel  -Prescribed prednisone taper 40 mg x 7 days and decrease by 10 mg every 7 days until patient reaches 10 mg then go down to 5 mg for 7 days to complete taper  -Prescribed azithromycin 500 mg x 5 days  -Prescribed Breztri 2 puffs twice daily. Counseled patient to rinse mouth thoroughly after each use. Sample given in clinic. Advised patient to use with spacer.  -Management of sinus disease per ENT  -Continue Singulair 10 mg nightly  -Continue albuterol HFA as needed.   Counseled patient to premedicate prior to exercise.  -Counseled patient avoid potential triggers of asthma.  -Counseled patient regarding poor medication compliance  -Continue Flonase  -Advised patient remain active  -Immunizations reviewed, COVID, pneumococcal, influenza vaccination up-to-date      Follow-up and Dispositions    Return in about 6 weeks (around 9/8/2022). Orders Placed This Encounter    CT CHEST WO CONT    CBC WITH AUTOMATED DIFF    IMMUNOGLOBULIN E, QT    ALLERGEN PROFILE, ZONE 2    ANCA PANEL    predniSONE (DELTASONE) 10 mg tablet    budesonide-glycopyr-formoterol (Breztri Aerosphere) 160-9-4.8 mcg/actuation HFAA    budesonide-glycopyr-formoterol (Breztri Aerosphere) 160-9-4.8 mcg/actuation HFAA    omeprazole (PRILOSEC) 40 mg capsule    azithromycin (ZITHROMAX) 500 mg tab     This patient has a high complexity chronic care condition   This Visit needed High complexity medically necessary decision making and management plans. Time spent in preparing for the visit-review of history, tests done prior to arrival, additional time reviewing clinical data, imaging, outside records and test results as well as time spent in ordering tests, treatments and referring patient for further care was a total of 30 minutes. Additional time-counseling with patient and family members regarding care plan 23 minutes.   Total aggregate time was 53 min spent in this patient encounter        Soha Hein MD/MPH     Pulmonary, [address] [facility]

## 2022-07-28 NOTE — PROGRESS NOTES
Guerda Nazario presents today for   Chief Complaint   Patient presents with    Cough with sputum    Results     CXR 4/2022    Wheezing    Shortness of Breath     With Activity        Is someone accompanying this pt? No    Is the patient using any DME equipment during OV? NA    -DME Company NA    Depression Screening:  3 most recent PHQ Screens 6/8/2022   Little interest or pleasure in doing things Not at all   Feeling down, depressed, irritable, or hopeless Not at all   Total Score PHQ 2 0       Learning Assessment:  Learning Assessment 4/20/2022   PRIMARY LEARNER Patient   HIGHEST LEVEL OF EDUCATION - PRIMARY LEARNER  SOME COLLEGE   BARRIERS PRIMARY LEARNER COGNITIVE   CO-LEARNER CAREGIVER No   PRIMARY LANGUAGE ENGLISH    NEED No   LEARNER PREFERENCE PRIMARY READING   LEARNING SPECIAL TOPICS No   ANSWERED BY patient   RELATIONSHIP SELF       Abuse Screening:  Abuse Screening Questionnaire 4/20/2022   Do you ever feel afraid of your partner? N   Are you in a relationship with someone who physically or mentally threatens you? N   Is it safe for you to go home? Y       Fall Risk  No flowsheet data found. Coordination of Care:  1. Have you been to the ER, urgent care clinic since your last visit? Hospitalized since your last visit? No    2. Have you seen or consulted any other health care providers outside of the [address] [place] [address] since your last visit? Include any pap smears or colon screening.  No

## 2022-07-28 NOTE — LETTER
8/7/2022    Patient: Taylor Padron   YOB: 1995   Date of Visit: 7/28/2022     [address]  [address]  [address] [facility] [address]  [address] [facility] [address]    Dear Alon Rubalcava MD,      Thank you for referring Ms. Erika Qureshi to [address] [place] [address] for evaluation. My notes for this consultation are attached. If you have questions, please do not hesitate to call me. I look forward to following your patient along with you.       Sincerely,    Cindy Sánchez MD

## 2022-07-31 LAB
A ALTERNATA IGE QN: <0.1 KU/L
A FUMIGATUS IGE QN: 0.13 KU/L
AMER ROACH IGE QN: <0.1 KU/L
BAHIA GRASS IGE QN: <0.1 KU/L
BASOPHILS # BLD AUTO: 0.1 X10E3/UL (ref 0–0.2)
BASOPHILS NFR BLD AUTO: 2 %
BERMUDA GRASS IGE QN: <0.1 KU/L
BOXELDER IGE QN: <0.1 KU/L
C HERBARUM IGE QN: <0.1 KU/L
C-ANCA TITR SER IF: NORMAL TITER
CAT DANDER IGE QN: <0.1 KU/L
CMN PIGWEED IGE QN: <0.1 KU/L
COMMON RAGWEED IGE QN: <0.1 KU/L
D FARINAE IGE QN: 0.11 KU/L
D PTERONYSS IGE QN: 0.11 KU/L
DOG DANDER IGE QN: <0.1 KU/L
ENGL PLANTAIN IGE QN: <0.1 KU/L
EOSINOPHIL # BLD AUTO: 0.2 X10E3/UL (ref 0–0.4)
EOSINOPHIL NFR BLD AUTO: 2 %
ERYTHROCYTE [DISTWIDTH] IN BLOOD BY AUTOMATED COUNT: 13.1 % (ref 11.7–15.4)
HCT VFR BLD AUTO: 40.3 % (ref 34–46.6)
HGB BLD-MCNC: 12.8 G/DL (ref 11.1–15.9)
IGE SERPL-ACNC: 36 IU/ML (ref 6–495)
IMM GRANULOCYTES # BLD AUTO: 0 X10E3/UL (ref 0–0.1)
IMM GRANULOCYTES NFR BLD AUTO: 0 %
JOHNSON GRASS IGE QN: <0.1 KU/L
LONDON PLANE IGE QN: <0.1 KU/L
LYMPHOCYTES # BLD AUTO: 2.1 X10E3/UL (ref 0.7–3.1)
LYMPHOCYTES NFR BLD AUTO: 29 %
Lab: ABNORMAL
M RACEMOSUS IGE QN: <0.1 KU/L
MCH RBC QN AUTO: 27.9 PG (ref 26.6–33)
MCHC RBC AUTO-ENTMCNC: 31.8 G/DL (ref 31.5–35.7)
MCV RBC AUTO: 88 FL (ref 79–97)
MONOCYTES # BLD AUTO: 0.6 X10E3/UL (ref 0.1–0.9)
MONOCYTES NFR BLD AUTO: 9 %
MT JUNIPER IGE QN: <0.1 KU/L
MUGWORT IGE QN: <0.1 KU/L
MYELOPEROXIDASE AB SER IA-ACNC: <0.2 UNITS (ref 0–0.9)
NETTLE IGE QN: <0.1 KU/L
NEUTROPHILS # BLD AUTO: 4.2 X10E3/UL (ref 1.4–7)
NEUTROPHILS NFR BLD AUTO: 58 %
P NOTATUM IGE QN: <0.1 KU/L
P-ANCA ATYPICAL TITR SER IF: NORMAL TITER
P-ANCA TITR SER IF: NORMAL TITER
PLATELET # BLD AUTO: 378 X10E3/UL (ref 150–450)
PROTEINASE3 AB SER IA-ACNC: <0.2 UNITS (ref 0–0.9)
RBC # BLD AUTO: 4.59 X10E6/UL (ref 3.77–5.28)
S BOTRYOSUM IGE QN: <0.1 KU/L
SHEEP SORREL IGE QN: <0.1 KU/L
SILVER BIRCH IGE QN: <0.1 KU/L
SWEET GUM IGE QN: <0.1 KU/L
TIMOTHY IGE QN: <0.1 KU/L
WBC # BLD AUTO: 7.2 X10E3/UL (ref 3.4–10.8)
WHITE ELM IGE QN: <0.1 KU/L
WHITE HICKORY IGE QN: <0.1 KU/L
WHITE MULBERRY IGE QN: <0.1 KU/L
WHITE OAK IGE QN: <0.1 KU/L

## 2022-08-17 ENCOUNTER — TELEPHONE (OUTPATIENT)
Dept: PULMONOLOGY | Age: 27
End: 2022-08-17

## 2022-08-17 NOTE — TELEPHONE ENCOUNTER
Jayne Ramirez from [facility] ([phone]) needs pt orders fax over for ct chest with out contrast sent to [facility] ([phone])   Fax [phone]  Call for further information.

## 2022-08-17 NOTE — TELEPHONE ENCOUNTER
Per [employer] they denied the patient to go to [facility] to get CT done but ok'ed to go to the outpatient CT DX center and asking for order to be faxed to them.  Done

## 2022-08-26 ENCOUNTER — DOCUMENTATION ONLY (OUTPATIENT)
Dept: PULMONOLOGY | Age: 27
End: 2022-08-26

## 2022-08-26 NOTE — PROGRESS NOTES
CT chest brought in by patient on disc, done at [facility], no report available. I personally reviewed images and it shows clear lung fields B/L, however there is B/L LL bronchiectasis in basilar segments, L>R, with some hazy scattered faint ggos. Will see patient in followup as scheduled and will need to consider workup for bronchiectasis, and consider airway clearance pending her response to therapy so far.             Norma Mtz MD/MPH     Pulmonary, Critical Care Medicine  [facility]

## 2022-09-07 ENCOUNTER — HOSPITAL ENCOUNTER (OUTPATIENT)
Dept: NON INVASIVE DIAGNOSTICS | Age: 27
Discharge: HOME | End: 2022-09-07
Attending: INTERNAL MEDICINE
Payer: COMMERCIAL

## 2022-09-07 ENCOUNTER — APPOINTMENT (OUTPATIENT)
Dept: CT IMAGING | Age: 27
End: 2022-09-07
Attending: INTERNAL MEDICINE
Payer: COMMERCIAL

## 2022-09-07 VITALS
SYSTOLIC BLOOD PRESSURE: 121 MMHG | WEIGHT: 142 LBS | BODY MASS INDEX: 27.88 KG/M2 | DIASTOLIC BLOOD PRESSURE: 80 MMHG | HEIGHT: 60 IN

## 2022-09-07 DIAGNOSIS — J45.50 POORLY CONTROLLED SEVERE PERSISTENT ASTHMA WITHOUT COMPLICATION: ICD-10-CM

## 2022-09-07 DIAGNOSIS — R06.09 DYSPNEA ON EXERTION: ICD-10-CM

## 2022-09-07 DIAGNOSIS — J40 BRONCHITIS: ICD-10-CM

## 2022-09-07 DIAGNOSIS — J31.0 CHRONIC RHINITIS: ICD-10-CM

## 2022-09-07 LAB
ECHO AO ROOT DIAM: 2.7 CM
ECHO AO ROOT INDEX: 1.68 CM/M2
ECHO LA VOL 2C: 41 ML (ref 22–52)
ECHO LA VOL 4C: 39 ML (ref 22–52)
ECHO LA VOLUME AREA LENGTH: 44 ML
ECHO LA VOLUME INDEX A2C: 25 ML/M2 (ref 16–34)
ECHO LA VOLUME INDEX A4C: 24 ML/M2 (ref 16–34)
ECHO LA VOLUME INDEX AREA LENGTH: 27 ML/M2 (ref 16–34)
ECHO LV E' LATERAL VELOCITY: 16 CM/S
ECHO LV E' SEPTAL VELOCITY: 12 CM/S
ECHO LV FRACTIONAL SHORTENING: 19 % (ref 28–44)
ECHO LV INTERNAL DIMENSION DIASTOLE INDEX: 2.3 CM/M2
ECHO LV INTERNAL DIMENSION DIASTOLIC: 3.7 CM (ref 3.9–5.3)
ECHO LV INTERNAL DIMENSION SYSTOLIC INDEX: 1.86 CM/M2
ECHO LV INTERNAL DIMENSION SYSTOLIC: 3 CM
ECHO LV IVSD: 1 CM (ref 0.6–0.9)
ECHO LV MASS 2D: 112.5 G (ref 67–162)
ECHO LV MASS INDEX 2D: 69.9 G/M2 (ref 43–95)
ECHO LV POSTERIOR WALL DIASTOLIC: 1 CM (ref 0.6–0.9)
ECHO LV RELATIVE WALL THICKNESS RATIO: 0.54
ECHO LVOT AREA: 2.8 CM2
ECHO LVOT DIAM: 1.9 CM
ECHO LVOT MEAN GRADIENT: 2 MMHG
ECHO LVOT PEAK GRADIENT: 4 MMHG
ECHO LVOT PEAK VELOCITY: 1 M/S
ECHO LVOT STROKE VOLUME INDEX: 32.2 ML/M2
ECHO LVOT SV: 51.9 ML
ECHO LVOT VTI: 18.3 CM
ECHO MV A VELOCITY: 0.51 M/S
ECHO MV E DECELERATION TIME (DT): 185.2 MS
ECHO MV E VELOCITY: 0.75 M/S
ECHO MV E/A RATIO: 1.47
ECHO MV E/E' LATERAL: 4.69
ECHO MV E/E' RATIO (AVERAGED): 5.47
ECHO MV E/E' SEPTAL: 6.25
ECHO RV TAPSE: 1.6 CM (ref 1.7–?)

## 2022-09-07 PROCEDURE — 93306 TTE W/DOPPLER COMPLETE: CPT

## 2022-09-12 DIAGNOSIS — R06.09 DYSPNEA ON EXERTION: ICD-10-CM

## 2022-09-12 DIAGNOSIS — J40 BRONCHITIS: ICD-10-CM

## 2022-09-12 DIAGNOSIS — J31.0 CHRONIC RHINITIS: ICD-10-CM

## 2022-09-12 DIAGNOSIS — J45.50 POORLY CONTROLLED SEVERE PERSISTENT ASTHMA WITHOUT COMPLICATION: ICD-10-CM

## 2022-09-19 ENCOUNTER — OFFICE VISIT (OUTPATIENT)
Dept: PULMONOLOGY | Age: 27
End: 2022-09-19
Payer: COMMERCIAL

## 2022-09-19 VITALS
HEART RATE: 66 BPM | WEIGHT: 148.8 LBS | RESPIRATION RATE: 16 BRPM | HEIGHT: 60 IN | DIASTOLIC BLOOD PRESSURE: 83 MMHG | SYSTOLIC BLOOD PRESSURE: 110 MMHG | OXYGEN SATURATION: 98 % | BODY MASS INDEX: 29.21 KG/M2 | TEMPERATURE: 98.2 F

## 2022-09-19 DIAGNOSIS — J40 BRONCHITIS: Primary | ICD-10-CM

## 2022-09-19 DIAGNOSIS — J45.50 POORLY CONTROLLED SEVERE PERSISTENT ASTHMA WITHOUT COMPLICATION: ICD-10-CM

## 2022-09-19 DIAGNOSIS — J47.0 BRONCHIECTASIS WITH ACUTE LOWER RESPIRATORY INFECTION (HCC): ICD-10-CM

## 2022-09-19 PROCEDURE — 99214 OFFICE O/P EST MOD 30 MIN: CPT | Performed by: INTERNAL MEDICINE

## 2022-09-19 RX ORDER — SODIUM CHLORIDE FOR INHALATION 7 %
VIAL, NEBULIZER (ML) INHALATION
Qty: 1344 ML | Refills: 3 | Status: SHIPPED | OUTPATIENT
Start: 2022-09-19

## 2022-09-19 RX ORDER — ALBUTEROL SULFATE 0.83 MG/ML
SOLUTION RESPIRATORY (INHALATION)
Qty: 360 EACH | Refills: 3 | Status: SHIPPED | OUTPATIENT
Start: 2022-09-19

## 2022-09-19 NOTE — PROGRESS NOTES
[address] [place] [address]  [phone]    [facility]  Pulmonary, Critical Care, and Sleep Medicine    Pulmonary Office Followup  Name: Ismael Palomino 33 y.o. female  MRN: [id number]  : 1995  Service Date: 22  Chief Complaint:   Chief Complaint   Patient presents with    Asthma     Follow up from 2022    Cough    Other     Bronchitis, IRAHETA    Results     Labs 2022, Ct chest 2022, echo 2022         History of Present Illness:  Ismael Palomino is a 33 y.o. female, who presents to Pulmonary clinic for followup of asthma. Patient was last seen in our clinic on 2022. In the interval, patient reports she completed her prednisone taper as well as azithromycin. Pt reports not feeling much better. Pt reports that sx improved with ABx but after that recurred quickly  Still having dyspnea with exertion --- improved initially and then worsened after completeing taper  Still having chronic cough of yellow sputum--still reports sputum is thick and sticky, feels like it is caught in the back of her throat, has to bend forward in order to expectorate effectively  Saw ENT, started on xhance but stopped due to headaches  Compliant with breztri  No use of as needed albuterol, patient reports that she mainly ewing through her symptoms. Past Medical History:   Diagnosis Date    ADD (attention deficit disorder)     Asthma     Headache      Past Surgical History:   Procedure Laterality Date    HX HEENT Right     \"skin graft in ear drum\" tympanoplasty    IR INJ SINUS TRACT THERAP Bilateral 2019    Removal of sinus tissue to widen cavity.      Family History   Problem Relation Age of Onset    Psychiatric Disorder Mother     Hypertension Maternal Grandmother     Glaucoma Maternal Grandmother     Cancer Maternal Grandfather     Cancer Paternal Grandmother     Parkinson's Disease Paternal Grandfather      Social History     Socioeconomic History    Marital status: SINGLE     Spouse name: Not on file    Number of children: Not on file    Years of education: Not on file    Highest education level: Not on file   Occupational History    Not on file   Tobacco Use    Smoking status: Never    Smokeless tobacco: Never   Vaping Use    Vaping Use: Never used   Substance and Sexual Activity    Alcohol use: Not Currently     Comment: rare    Drug use: No    Sexual activity: Not Currently     Partners: Male   Other Topics Concern    Not on file   Social History Narrative    Not on file     Social Determinants of Health     Financial Resource Strain: Not on file   Food Insecurity: Not on file   Transportation Needs: Not on file   Physical Activity: Not on file   Stress: Not on file   Social Connections: Not on file   Intimate Partner Violence: Not on file   Housing Stability: Not on file     Allergies   Allergen Reactions    Macrobid [Nitrofurantoin Monohyd/M-Cryst] Itching     Prior to Admission medications    Medication Sig Start Date End Date Taking? Authorizing Provider   budesonide-glycopyr-formoterol (Ephriam Bran) 160-9-4.8 mcg/actuation HFAA Take 2 Puffs by inhalation two (2) times a day. Rinse and gargle after each use 7/28/22  Yes Chantale Ovalle MD   albuterol (PROVENTIL HFA, VENTOLIN HFA, PROAIR HFA) 90 mcg/actuation inhaler Take 2 Puffs by inhalation every six (6) hours as needed for Wheezing or Shortness of Breath. 6/8/22  Yes Bala Yo MD   albuterol (PROVENTIL VENTOLIN) 2.5 mg /3 mL (0.083 %) nebu 3 mL by Nebulization route every four (4) hours as needed (cough, chest tightness). 6/8/22  Yes Bala Yo MD   pirocin [facility]) 2 % ointment apply by topical route 1/2 inch to benito med rinse bottle, rinse bilateral nares BID everyday 8/11/21  Yes Provider, Historical   LORazepam (ATIVAN) 1 mg tablet Take 1 mg by mouth daily.  5/4/22  Yes Provider, Historical   methylphenidate HCl (RITALIN) 10 mg tablet Take 10 mg by mouth two (2) times a day. Yes Provider, Historical   methylphenidate HCl (RITALIN) 5 mg tablet Take 5 mg by mouth two (2) times a day. Yes Provider, Historical   QUEtiapine (SEROquel) 50 mg tablet Take 50 mg by mouth nightly. 4/6/22  Yes Provider, Historical   norgestimate-ethinyl estradioL (ORTHO TRI-CYCLEN LO) 0.18/0.215/0.25 mg-25 mcg tab Take 1 Tablet by mouth daily. 3/7/22  Yes Pauline Hart MD   budesonide (PULMICORT) 0.5 mg/2 mL nbsp 500 mcg by Nebulization route two (2) times a day. 12/19/19  Yes Provider, Historical   fexofenadine (ALLEGRA) 180 mg tablet Take 180 mg by mouth daily. Yes Provider, Historical   fluticasone propionate (FLONASE) 50 mcg/actuation nasal spray 1 spray each nostril daily as needed for nasal congestion. Patient taking differently: 2 Sprays by Both Nostrils route daily as needed. 1 spray each nostril daily as needed for nasal congestion. 2/5/20  Yes Pauline Hart MD   montelukast (SINGULAIR) 10 mg tablet Take 1 Tab by mouth daily. 1/15/20  Yes Jose Pierce MD   inhalational spacing device (AEROCHAMBER Z-STAT PLUS) 1 Each by Does Not Apply route as needed for Wheezing (use with symbicort). Dx. J45.5 1/15/20  Yes Jose Pierce MD   ibuprofen (MOTRIN) 200 mg tablet Take  by mouth every six (6) hours as needed for Pain. Yes Provider, Historical   ondansetron hcl (ZOFRAN) 4 mg tablet Take 4 mg by mouth.   Patient not taking: No sig reported 11/19/21 9/19/22  Provider, Historical   acyclovir (ZOVIRAX) 400 mg tablet TAKE 1 TABLET BY MOUTH THREE TIMES DAILY  Patient not taking: Reported on 7/28/2022 9/27/21   Pauline Hart MD     Immunization History   Administered Date(s) Administered    COVID-19, [address] [facility] border, Primary or Immunocompromised, (age 18y+), IM, 100 mcg/0.5mL 01/08/2021, 02/05/2021, 12/08/2021    HPV (9-valent) 12/12/2017, 02/12/2018    HPV (Quad) 06/14/2018    Influenza, FLUARIX, FLULAVAL, FLUZONE (age 10 mo+) AND AFLURIA, (age 1 y+), PF, 0.5mL 08/22/2018, 08/26/2019, 12/21/2020, 09/29/2021    Pneumococcal Polysaccharide (PPSV-23) 08/14/2020    Tdap 01/20/2021       Review of Systems:  A complete review of systems was performed as stated in the HPI, all others are negative. Objective:    Physical Exam:  /83 (BP 1 Location: Left upper arm, BP Patient Position: Sitting, BP Cuff Size: Adult)   Pulse 66   Temp 98.2 °F (36.8 °C) (Temporal)   Resp 16   Ht 5' (1.524 m)   Wt 67.5 kg (148 lb 12.8 oz)   LMP 09/17/2022   SpO2 98%   BMI 29.06 kg/m²   Vitals were personally reviewed  Gen: no acute distress, pleasant and cooperative, sitting up in chair, ambulates without difficulty  HEENT: normocephalic/atraumatic, no ocular drainage, EOMI, no scleral icterus, nasal bridge midline, unable to assess nasal and oral cavities due to patient wearing mask in the setting of COVID-19 pandemic  CVS: regular rate rhythm, S1/S2, no murmurs/rubs/gallops  Lungs: Poor air entry B/L, a few faint rhonchi in left base, no wheezes/rales  Psych: normal memory, thought content, and processing    Labs: I have reviewed the patient's available labs  --  Lab Results   Component Value Date/Time    WBC 7.2 07/28/2022 12:00 AM    HGB 12.8 07/28/2022 12:00 AM    HCT 40.3 07/28/2022 12:00 AM    PLATELET 249 84/22/4423 12:00 AM    MCV 88 07/28/2022 12:00 AM   Peripheral eosinophil count 200  Lab Results   Component Value Date/Time    Immunoglobulin E 36 07/28/2022 12:00 AM    Immunoglobulin E 9 07/24/2019 12:00 AM   ANCA panel negative    Imaging:  I have personally reviewed patient's imaging --CT chest done with MRI CT diagnostics on 8/25/2022 reviewed on disc, shows clear lung fields bilaterally without infiltrate. In bases, patient has bronchiectasis seen in lower lobes, left greater than right with a few scattered groundglass opacities. No masses, consolidations, effusions were seen.   Official report was scanned into [facility]    PFTs: 7/24/2019--spirometry is normal, but shows a reduced FEV1 at 75%. No BD response. TTE:  I have reviewed the patient's TTE results  09/07/22    ECHO ADULT COMPLETE 09/07/2022 9/7/2022    Interpretation Summary  Formatting of this result is different from the original.      Left Ventricle: Normal left ventricular systolic function with a visually estimated EF of 55 - 60%. Left ventricle is smaller than normal. Increased wall thickness. Findings consistent with mild concentric hypertrophy. Normal wall motion. Normal diastolic function. Signed by: Trip Sharpe DO on 9/7/2022  4:15 PM        Assessment and Plan:  33 y.o. female with:    Impression:  1. Bronchiectasis, likely non-CF, with LRTI: Etiology of bronchiectasis unclear at this point. ANCA panel negative. 2.  Poorly controlled severe persistent asthma with acute bronchitis  3. Chronic rhinitis  4. Chronic cough: Etiology multifactorial, due to above --- suspect bronchiectasis to be the main   5. 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 6.  Distant exertion/shortness of breath: Likely due to above as well as deconditioning --- TTE from 9/7/22 was mainly wnl    Plan:  -Discussed testing noted as above, specifically CT chest showing significant bronchiectasis in bilateral lower lobes which is likely causing patient's recurrent LRTI with bronchitis  -Obtain QuIGs (A/G/M), A1AT phenotype and level, CF mutation analysis since sweat chloride testing cannot be performed locally  -Start patient on airway clearance with:  --Flutter device 3 times daily  --7% hypertonic saline nebs 3 times daily (to be done with albuterol)  --Albuterol nebs 3 times daily  --We will not do Mucinex since patient did not have any change to sputum previously  -Obtain sputum for gram stain and culture as well as AFB culture  -Continue Breztri 2 puffs twice daily with spacer.   Counseled patient to rinse mouth thoroughly after each use  -Management of sinus disease per ENT  -Continue Singulair 10 mg nightly  -Continue albuterol HFA as needed. Counseled patient to premedicate prior to exercise.  -Counseled patient avoid potential triggers of asthma.  -Counseled patient regarding poor medication compliance  -Continue Flonase  -Advised patient remain active  -Immunizations reviewed, COVID, pneumococcal vaccinations up-to-date    Follow-up and Dispositions    Return in about 2 months (around 11/19/2022).        Orders Placed This Encounter    AMB SUPPLY ORDER    AFB CULTURE + SMEAR W/RFLX ID FROM CULTURE    IMMUNOGLOBULINS, G/A/M, QT.    ALPHA-1-ANTITRYPSIN, TOTAL, PHENOTYPE    CYSTIC FIBROSIS MUTATION 97    CYSTIC FIBROSIS (CF) LOWER RESPIRATORY CULTURE    sodium chloride (PulmosaL) 7 % nebulizer solution    albuterol (PROVENTIL VENTOLIN) 2.5 mg /3 mL (0.083 %) joaquin Hunter MD/MPH     Pulmonary, Critical Care Medicine  [address] [place] [address] [facility]

## 2022-09-19 NOTE — PROGRESS NOTES
Guerda Nazario presents today for   Chief Complaint   Patient presents with    Asthma     Follow up from 8/7/2022    Cough    Other     Bronchitis, IRAHETA    Results     Labs 7/28/2022, Ct chest 8/26/2022, echo 9/7/2022       Is someone accompanying this pt? No    Is the patient using any DME equipment during OV? No    -DME Company N/A    Depression Screening:  3 most recent PHQ Screens 6/8/2022   Little interest or pleasure in doing things Not at all   Feeling down, depressed, irritable, or hopeless Not at all   Total Score PHQ 2 0       Learning Assessment:  Learning Assessment 4/20/2022   PRIMARY LEARNER Patient   HIGHEST LEVEL OF EDUCATION - PRIMARY LEARNER  SOME COLLEGE   BARRIERS PRIMARY LEARNER COGNITIVE   CO-LEARNER CAREGIVER No   PRIMARY LANGUAGE ENGLISH    NEED No   LEARNER PREFERENCE PRIMARY READING   LEARNING SPECIAL TOPICS No   ANSWERED BY patient   RELATIONSHIP SELF       Abuse Screening:  Abuse Screening Questionnaire 4/20/2022   Do you ever feel afraid of your partner? N   Are you in a relationship with someone who physically or mentally threatens you? N   Is it safe for you to go home? Y       Fall Risk  No flowsheet data found. Coordination of Care:  1. Have you been to the ER, urgent care clinic since your last visit? Hospitalized since your last visit? No    2. Have you seen or consulted any other health care providers outside of the [address] [place] [address] since your last visit? Include any pap smears or colon screening. Yes. Dr. Gabriel Whitlock, ENT    Offered influenza vaccine, but patient declined at this time.

## 2022-09-19 NOTE — LETTER
9/19/2022    Patient: Douglas Francis   YOB: 1995   Date of Visit: 9/19/2022     Alyssa Arteaga, [address] [facility]  [address]  [address] [facility]  V[address] [facility] [address]    Dear Alyssa Arteaga MD,      Thank you for referring Ms. Silvano Preciado to [address] [place] [address] for evaluation. My notes for this consultation are attached. If you have questions, please do not hesitate to call me. I look forward to following your patient along with you.       Sincerely,    Maite Roberts MD

## 2022-09-21 ENCOUNTER — HOSPITAL ENCOUNTER (OUTPATIENT)
Dept: LAB | Age: 27
Discharge: HOME | End: 2022-09-21

## 2022-09-21 LAB — XX-LABCORP SPECIMEN COL,LCBCF: NORMAL

## 2022-09-21 PROCEDURE — 99001 SPECIMEN HANDLING PT-LAB: CPT

## 2022-09-28 ENCOUNTER — TELEPHONE (OUTPATIENT)
Dept: PULMONOLOGY | Age: 27
End: 2022-09-28

## 2022-09-28 NOTE — TELEPHONE ENCOUNTER
Patient states the ECHO was denied due to the dx that was submitted was for rhinitis. I advised the patient she needs to contact the hospital billing department. There was 4 dx's on the order that was sent.  She will call them re her bill

## 2022-09-28 NOTE — TELEPHONE ENCOUNTER
Pt stated that the insurance did not cover the echo d/t it wasn't medically needed.  Pt is asked for it to be changed to it was medically needed please call [phone]

## 2022-09-29 ENCOUNTER — TELEPHONE (OUTPATIENT)
Dept: PULMONOLOGY | Age: 27
End: 2022-09-29

## 2022-09-29 DIAGNOSIS — A49.8 PSEUDOMONAS AERUGINOSA INFECTION: Primary | ICD-10-CM

## 2022-09-29 DIAGNOSIS — J47.0 BRONCHIECTASIS WITH ACUTE LOWER RESPIRATORY INFECTION (HCC): ICD-10-CM

## 2022-09-29 RX ORDER — LEVOFLOXACIN 750 MG/1
750 TABLET ORAL DAILY
Qty: 14 TABLET | Refills: 0 | Status: SHIPPED | OUTPATIENT
Start: 2022-09-29

## 2022-09-29 NOTE — TELEPHONE ENCOUNTER
Called pt regarding sputum cx growing heavy pseudomonas. This is secondary to bronchiectasis--work-up so far of underlying cause of bronchiectasis is negative, negative CF mutation analysis, QuIGs wnl, no A1AT deficiency and normal phenotype. Will attempt eradication with Levaquin 750 mg daily x2 weeks. We will also refer patient to ID to assist with management. Advised patient to continue airway clearance with hypertonic saline nebs at least twice daily. All questions answered --also discussed questions regarding increased weight gain, likely secondary to course of steroids performed last month.       Klarissa Lopes MD/MPH     Pulmonary, Critical Care Medicine  [facility]

## 2022-09-30 DIAGNOSIS — J47.0 BRONCHIECTASIS WITH ACUTE LOWER RESPIRATORY INFECTION (HCC): ICD-10-CM

## 2022-09-30 DIAGNOSIS — J40 BRONCHITIS: ICD-10-CM

## 2022-09-30 DIAGNOSIS — J45.50 POORLY CONTROLLED SEVERE PERSISTENT ASTHMA WITHOUT COMPLICATION: ICD-10-CM

## 2022-10-06 ENCOUNTER — PATIENT MESSAGE (OUTPATIENT)
Dept: PULMONOLOGY | Age: 27
End: 2022-10-06

## 2022-10-06 RX ORDER — ONDANSETRON 4 MG/1
4 TABLET, FILM COATED ORAL
Qty: 20 TABLET | Refills: 2 | Status: SHIPPED | OUTPATIENT
Start: 2022-10-06

## 2022-10-06 NOTE — TELEPHONE ENCOUNTER
Zofran prescribed for nausea with levaquin --- prescribed for pseudomonas resp infection        Alvaro Tapia MD/MPH     Pulmonary, Critical Care Medicine  [facility]

## 2022-10-10 DIAGNOSIS — J40 BRONCHITIS: ICD-10-CM

## 2022-10-10 DIAGNOSIS — J45.50 POORLY CONTROLLED SEVERE PERSISTENT ASTHMA WITHOUT COMPLICATION: ICD-10-CM

## 2022-10-10 DIAGNOSIS — R06.09 DYSPNEA ON EXERTION: ICD-10-CM

## 2022-10-10 DIAGNOSIS — J31.0 CHRONIC RHINITIS: ICD-10-CM

## 2022-10-12 ENCOUNTER — HOSPITAL ENCOUNTER (OUTPATIENT)
Dept: LAB | Age: 27
Discharge: HOME | End: 2022-10-12

## 2022-10-12 ENCOUNTER — VIRTUAL VISIT (OUTPATIENT)
Dept: FAMILY MEDICINE CLINIC | Age: 27
End: 2022-10-12
Payer: COMMERCIAL

## 2022-10-12 DIAGNOSIS — J45.50 POORLY CONTROLLED SEVERE PERSISTENT ASTHMA WITHOUT COMPLICATION: Primary | ICD-10-CM

## 2022-10-12 DIAGNOSIS — J31.0 CHRONIC RHINITIS: ICD-10-CM

## 2022-10-12 DIAGNOSIS — J47.0 BRONCHIECTASIS WITH ACUTE LOWER RESPIRATORY INFECTION (HCC): ICD-10-CM

## 2022-10-12 DIAGNOSIS — M79.10 GENERALIZED MUSCLE ACHE: Primary | ICD-10-CM

## 2022-10-12 DIAGNOSIS — R06.09 DYSPNEA ON EXERTION: ICD-10-CM

## 2022-10-12 LAB — XX-LABCORP SPECIMEN COL,LCBCF: NORMAL

## 2022-10-12 PROCEDURE — 99213 OFFICE O/P EST LOW 20 MIN: CPT | Performed by: FAMILY MEDICINE

## 2022-10-12 PROCEDURE — 99001 SPECIMEN HANDLING PT-LAB: CPT

## 2022-10-12 NOTE — PROGRESS NOTES
Sully Carty is a 33 y.o. female who was seen by synchronous (real-time) audio-video technology on 10/12/2022 for Other (Body stiffness and pain)        Assessment & Plan:   Diagnoses and all orders for this visit:    1. Generalized muscle ache: No joint swelling or redness. Generalized fatigue. Checking labs. Advised to drink lots of water. Tylenol Motrin as needed. If needed will consider rheumatology referral.  She is currently on Levaquin. Symptoms started week after. We will follow-up after results. -     SED RATE (ESR); Future  -     CK; Future  -     METABOLIC PANEL, COMPREHENSIVE; Future  -     TSH 3RD GENERATION; Future  -     ANTINUCLEAR ANTIBODIES, IFA; Future  -     CBC WITH AUTOMATED DIFF; Future  -     URINALYSIS W/ RFLX MICROSCOPIC; Future    Patient understood agreed with the plan      I spent at least 10-15 minutes on this visit with this established patient. 712  Subjective:     [facility] generalized body ache and muscle ache. No joint swelling or redness. Going on since 1 week. It started couple of days after she started Levaquin for bronchitis. Does have a chronic cough. No wheezing or chest congestion. No chest pain or shortness of breath. No palpitation or diaphoresis. During visit sitting comfortable without any acute distress. Able to talk in full sentences. Try taking Tylenol or Motrin not helping much. Having difficulty moving around. No fever. No sore throat. No urinary complaint. Will check labs and meantime advised her to drink lots of water. Tylenol and Aleve as needed. Patient understood and agreed with the plan. If no improvement will consider rheumatology recommendation  Prior to Admission medications    Medication Sig Start Date End Date Taking? Authorizing Provider   ondansetron hcl (Zofran) 4 mg tablet Take 1 Tablet by mouth every eight (8) hours as needed for Nausea or Vomiting.  10/6/22  Yes Joe Plasencia MD levoFLOXacin (LEVAQUIN) 750 mg tablet Take 1 Tablet by mouth daily. 9/29/22  Yes Claudetta Rainbow, MD   sodium chloride (PulmosaL) 7 % nebulizer solution Use one vial (4mL) with albuterol via nebulizer four times per day. Dx: J47.0, J45.5, J40 9/19/22  Yes Claudetta Rainbow, MD   albuterol (PROVENTIL VENTOLIN) 2.5 mg /3 mL (0.083 %) nebu Use one vial with hypertonic saline neb, four times per day. Dx: J47.0, J45.5, J40 9/19/22  Yes Claudetta Rainbow, MD   budesonide-glycopyr-formoterol [facility]) 160-9-4.8 mcg/actuation HFAA Take 2 Puffs by inhalation two (2) times a day. Rinse and gargle after each use 7/28/22  Yes Claudetta Rainbow, MD   albuterol (PROVENTIL HFA, VENTOLIN HFA, PROAIR HFA) 90 mcg/actuation inhaler Take 2 Puffs by inhalation every six (6) hours as needed for Wheezing or Shortness of Breath. 6/8/22  Yes Liz Mccarthy MD   albuterol (PROVENTIL VENTOLIN) 2.5 mg /3 mL (0.083 %) nebu 3 mL by Nebulization route every four (4) hours as needed (cough, chest tightness). 6/8/22  Yes Liz Mccarthy MD   pirocin [facility]) 2 % ointment apply by topical route 1/2 inch to benito med rinse bottle, rinse bilateral nares BID everyday 8/11/21  Yes Provider, Historical   LORazepam (ATIVAN) 1 mg tablet Take 1 mg by mouth daily. 5/4/22  Yes Provider, Historical   methylphenidate HCl (RITALIN) 10 mg tablet Take 10 mg by mouth two (2) times a day. Yes Provider, Historical   methylphenidate HCl (RITALIN) 5 mg tablet Take 5 mg by mouth two (2) times a day. Yes Provider, Historical   QUEtiapine (SEROquel) 50 mg tablet Take 50 mg by mouth nightly. 4/6/22  Yes Provider, Historical   norgestimate-ethinyl estradioL (ORTHO TRI-CYCLEN LO) 0.18/0.215/0.25 mg-25 mcg tab Take 1 Tablet by mouth daily. 3/7/22  Yes Liz Mccarthy MD   budesonide (PULMICORT) 0.5 mg/2 mL nbsp 500 mcg by Nebulization route two (2) times a day. 12/19/19  Yes Provider, Historical   fexofenadine (ALLEGRA) 180 mg tablet Take 180 mg by mouth daily. Yes Provider, Historical   fluticasone propionate (FLONASE) 50 mcg/actuation nasal spray 1 spray each nostril daily as needed for nasal congestion. Patient taking differently: 2 Sprays by Both Nostrils route daily as needed. 1 spray each nostril daily as needed for nasal congestion. 2/5/20  Yes Carolyn George MD   montelukast (SINGULAIR) 10 mg tablet Take 1 Tab by mouth daily. 1/15/20  Yes Roque Yanes MD   inhalational spacing device (AEROCHAMBER Z-STAT PLUS) 1 Each by Does Not Apply route as needed for Wheezing (use with symbicort). Dx. J45.5 1/15/20  Yes Roque Yanes MD   ibuprofen (MOTRIN) 200 mg tablet Take  by mouth every six (6) hours as needed for Pain. Yes Provider, Historical   acyclovir (ZOVIRAX) 400 mg tablet TAKE 1 TABLET BY MOUTH THREE TIMES DAILY  Patient not taking: Reported on 7/28/2022 9/27/21   Carolyn George MD     Patient Active Problem List    Diagnosis Date Noted    Allergic rhinitis 03/18/2021    Irregular periods/menstrual cycles 04/01/2019    H/O herpes genitalis 04/01/2019    Attention deficit disorder 04/01/2019    Allergic rhinitis due to pollen 05/15/2015    Asthma 03/18/2015       ROS: See HPI    Objective:     Patient-Reported Vitals 10/12/2022   Patient-Reported Weight 147   Patient-Reported Pulse 85   Patient-Reported SpO2 100   Patient-Reported LMP -      General: alert, cooperative, no distress   Mental  status: normal mood, behavior, speech, dress, motor activity, and thought processes, able to follow commands   HENT: NCAT   Neck: no visualized mass   Resp: no respiratory distress   Neuro: no gross deficits   Skin: no discoloration or lesions of concern on visible areas   Psychiatric: normal affect, consistent with stated mood, no evidence of hallucinations     Additional exam findings: We discussed the expected course, resolution and complications of the diagnosis(es) in detail.   Medication risks, benefits, costs, interactions, and alternatives were discussed as indicated. I advised her to contact the office if her condition worsens, changes or fails to improve as anticipated. She expressed understanding with the diagnosis(es) and plan. Galindo Menjivar, was evaluated through a synchronous (real-time) audio-video encounter. The patient (or guardian if applicable) is aware that this is a billable service, which includes applicable co-pays. This Virtual Visit was conducted with patient's (and/or legal guardian's) consent. The visit was conducted pursuant to the emergency declaration under the [address] [place] [address], [address] [place] [address] authority and the [employer] and [employer] General Act. Patient identification was verified, and a caregiver was present when appropriate.   The patient was located at: Home: [address] [place] [phone]  The provider was located at: Home: [unfilled]        [facility] MD Jm

## 2022-10-12 NOTE — PROGRESS NOTES
1. \"Have you been to the ER, urgent care clinic since your last visit? Hospitalized since your last visit? \" No    2. \"Have you seen or consulted any other health care providers outside of the [address] [place] [address] since your last visit? \" No     3. For patients aged 39-70: Has the patient had a colonoscopy / FIT/ Cologuard? No      If the patient is female:    4. For patients aged 41-77: Has the patient had a mammogram within the past 2 years? No      5. For patients aged 21-65: Has the patient had a pap smear?  Yes - no Care Gap present  8/14/2020    Chief Complaint   Patient presents with    Other     Body stiffness and pain

## 2022-10-12 NOTE — PROGRESS NOTES
Order placed for Acapella/flutter device, per Verbal Order from Dr. Kasi Oviedo on 10/12/2022. Last office visit: 9/19/2022  Follow up Visit: 11/30/2022    Provider is aware of last office visit and follow up. No further action requested from provider.

## 2022-10-14 ENCOUNTER — PATIENT MESSAGE (OUTPATIENT)
Dept: PULMONOLOGY | Age: 27
End: 2022-10-14

## 2022-10-15 LAB
ALBUMIN SERPL-MCNC: 3.9 G/DL (ref 3.9–5)
ALBUMIN/GLOB SERPL: 1.4 {RATIO} (ref 1.2–2.2)
ALP SERPL-CCNC: 43 IU/L (ref 44–121)
ALT SERPL-CCNC: 10 IU/L (ref 0–32)
ANA SER QL IF: NEGATIVE
APPEARANCE UR: ABNORMAL
AST SERPL-CCNC: 15 IU/L (ref 0–40)
BACTERIA #/AREA URNS HPF: NORMAL /[HPF]
BASOPHILS # BLD AUTO: 0.1 X10E3/UL (ref 0–0.2)
BASOPHILS NFR BLD AUTO: 1 %
BILIRUB SERPL-MCNC: <0.2 MG/DL (ref 0–1.2)
BILIRUB UR QL STRIP: NEGATIVE
BUN SERPL-MCNC: 14 MG/DL (ref 6–20)
BUN/CREAT SERPL: 18 (ref 9–23)
CALCIUM SERPL-MCNC: 9.3 MG/DL (ref 8.7–10.2)
CASTS URNS QL MICRO: NORMAL /LPF
CHLORIDE SERPL-SCNC: 102 MMOL/L (ref 96–106)
CK SERPL-CCNC: 97 U/L (ref 32–182)
CO2 SERPL-SCNC: 22 MMOL/L (ref 20–29)
COLOR UR: YELLOW
CREAT SERPL-MCNC: 0.8 MG/DL (ref 0.57–1)
EGFR: 104 ML/MIN/1.73
EOSINOPHIL # BLD AUTO: 0.1 X10E3/UL (ref 0–0.4)
EOSINOPHIL NFR BLD AUTO: 2 %
EPI CELLS #/AREA URNS HPF: NORMAL /HPF (ref 0–10)
ERYTHROCYTE [DISTWIDTH] IN BLOOD BY AUTOMATED COUNT: 13.5 % (ref 11.7–15.4)
ERYTHROCYTE [SEDIMENTATION RATE] IN BLOOD BY WESTERGREN METHOD: 2 MM/HR (ref 0–32)
GLOBULIN SER CALC-MCNC: 2.8 G/DL (ref 1.5–4.5)
GLUCOSE SERPL-MCNC: 94 MG/DL (ref 70–99)
GLUCOSE UR QL STRIP: NEGATIVE
HCT VFR BLD AUTO: 36.9 % (ref 34–46.6)
HGB BLD-MCNC: 12.4 G/DL (ref 11.1–15.9)
HGB UR QL STRIP: ABNORMAL
IMM GRANULOCYTES # BLD AUTO: 0 X10E3/UL (ref 0–0.1)
IMM GRANULOCYTES NFR BLD AUTO: 0 %
KETONES UR QL STRIP: NEGATIVE
LEUKOCYTE ESTERASE UR QL STRIP: NEGATIVE
LYMPHOCYTES # BLD AUTO: 2.3 X10E3/UL (ref 0.7–3.1)
LYMPHOCYTES NFR BLD AUTO: 30 %
MCH RBC QN AUTO: 28.7 PG (ref 26.6–33)
MCHC RBC AUTO-ENTMCNC: 33.6 G/DL (ref 31.5–35.7)
MCV RBC AUTO: 85 FL (ref 79–97)
MICRO URNS: ABNORMAL
MONOCYTES # BLD AUTO: 0.5 X10E3/UL (ref 0.1–0.9)
MONOCYTES NFR BLD AUTO: 6 %
NEUTROPHILS # BLD AUTO: 4.6 X10E3/UL (ref 1.4–7)
NEUTROPHILS NFR BLD AUTO: 61 %
NITRITE UR QL STRIP: NEGATIVE
PH UR STRIP: 6.5 [PH] (ref 5–7.5)
PLATELET # BLD AUTO: 302 X10E3/UL (ref 150–450)
POTASSIUM SERPL-SCNC: 3.9 MMOL/L (ref 3.5–5.2)
PROT SERPL-MCNC: 6.7 G/DL (ref 6–8.5)
PROT UR QL STRIP: NEGATIVE
RBC # BLD AUTO: 4.32 X10E6/UL (ref 3.77–5.28)
RBC #/AREA URNS HPF: NORMAL /HPF (ref 0–2)
SODIUM SERPL-SCNC: 138 MMOL/L (ref 134–144)
SP GR UR STRIP: 1.02 (ref 1–1.03)
TSH SERPL DL<=0.005 MIU/L-ACNC: 1.35 UIU/ML (ref 0.45–4.5)
UROBILINOGEN UR STRIP-MCNC: 0.2 MG/DL (ref 0.2–1)
WBC # BLD AUTO: 7.6 X10E3/UL (ref 3.4–10.8)
WBC #/AREA URNS HPF: NORMAL /HPF (ref 0–5)

## 2022-10-18 NOTE — TELEPHONE ENCOUNTER
Per Dr. Justice Menendez, called patient to advised her to stop taking levaquin and continue to use her nebulized meds. Dr. Justice Menendez will also order nebulized Tobramycin at this time. Also advised patient keep her 10/27 appt with Dr. Robyn Rodas to further discuss other alternative treatments. Patient verbalized understanding.

## 2022-10-19 DIAGNOSIS — A49.8 PSEUDOMONAS AERUGINOSA INFECTION: ICD-10-CM

## 2022-10-19 DIAGNOSIS — J47.0 BRONCHIECTASIS WITH ACUTE LOWER RESPIRATORY INFECTION (HCC): Primary | ICD-10-CM

## 2022-10-19 RX ORDER — TOBRAMYCIN INHALATION SOLUTION 300 MG/5ML
300 INHALANT RESPIRATORY (INHALATION) 2 TIMES DAILY
Qty: 300 ML | Refills: 6 | Status: SHIPPED | OUTPATIENT
Start: 2022-10-19

## 2022-10-19 NOTE — PROGRESS NOTES
Pt unable to tolerate levaquin. Will start natalya nebs 28d on/28d off. Followup with ID when possible.       Helen Whatley MD/MPH     Pulmonary, Critical Care Medicine  [facility]

## 2022-10-24 ENCOUNTER — TELEPHONE (OUTPATIENT)
Dept: PULMONOLOGY | Age: 27
End: 2022-10-24

## 2022-10-24 NOTE — TELEPHONE ENCOUNTER
Nolberto has requested a PA for the patient. I have started the PA to see if they will approve.  If they do not then we can send to [facility]

## 2022-10-25 DIAGNOSIS — J40 BRONCHITIS: ICD-10-CM

## 2022-10-25 DIAGNOSIS — J45.50 POORLY CONTROLLED SEVERE PERSISTENT ASTHMA WITHOUT COMPLICATION: ICD-10-CM

## 2022-10-25 DIAGNOSIS — J47.0 BRONCHIECTASIS WITH ACUTE LOWER RESPIRATORY INFECTION (HCC): ICD-10-CM

## 2022-10-26 ENCOUNTER — DOCUMENTATION ONLY (OUTPATIENT)
Dept: PULMONOLOGY | Age: 27
End: 2022-10-26

## 2022-10-26 NOTE — PROGRESS NOTES
Patient was recently diagnosed bronchiectasis with lower respiratory tract infection. Sputum cultures (9/21/22) growing heavy Pseudomonas. Patient attempted eradication therapy with Levaquin, unable to tolerate due to severe myalgias--failed repeat challenge. Discussed with ID, given bronchiectasis and inability for patient to clear infection, and severity of infection, patient will require nebulized antibiotics--prescribed KRYSTIN nebs twice daily (28 days on and then 28 days off). Prior authorization filled out for [employer], further paperwork being submitted for approval.  Patient also to continue airway clearance with hypertonic saline nebs 2-4 times daily along with flutter device.       Lev Goddard MD/MPH     Pulmonary, Critical Care Medicine  [facility]

## 2022-11-01 ENCOUNTER — DOCUMENTATION ONLY (OUTPATIENT)
Dept: PULMONOLOGY | Age: 27
End: 2022-11-01

## 2022-11-15 ENCOUNTER — HOSPITAL ENCOUNTER (OUTPATIENT)
Dept: LAB | Age: 27
Discharge: HOME | End: 2022-11-15

## 2022-11-15 LAB — XX-LABCORP SPECIMEN COL,LCBCF: NORMAL

## 2022-11-15 PROCEDURE — 99001 SPECIMEN HANDLING PT-LAB: CPT

## 2022-11-22 PROBLEM — J47.9 BRONCHIECTASIS (HCC): Status: ACTIVE | Noted: 2022-11-22

## 2022-11-30 ENCOUNTER — OFFICE VISIT (OUTPATIENT)
Dept: PULMONOLOGY | Age: 27
End: 2022-11-30
Payer: COMMERCIAL

## 2022-11-30 VITALS
HEART RATE: 68 BPM | BODY MASS INDEX: 29.19 KG/M2 | OXYGEN SATURATION: 99 % | HEIGHT: 60 IN | DIASTOLIC BLOOD PRESSURE: 76 MMHG | TEMPERATURE: 98.2 F | RESPIRATION RATE: 16 BRPM | SYSTOLIC BLOOD PRESSURE: 116 MMHG | WEIGHT: 148.7 LBS

## 2022-11-30 DIAGNOSIS — A49.8 PSEUDOMONAS AERUGINOSA INFECTION: ICD-10-CM

## 2022-11-30 DIAGNOSIS — R53.81 PHYSICAL DECONDITIONING: ICD-10-CM

## 2022-11-30 DIAGNOSIS — R06.09 DYSPNEA ON EXERTION: Primary | ICD-10-CM

## 2022-11-30 DIAGNOSIS — J47.9 BRONCHIECTASIS WITHOUT COMPLICATION (HCC): ICD-10-CM

## 2022-11-30 DIAGNOSIS — J45.50 SEVERE PERSISTENT ASTHMA WITHOUT COMPLICATION: ICD-10-CM

## 2022-11-30 PROCEDURE — 99214 OFFICE O/P EST MOD 30 MIN: CPT | Performed by: INTERNAL MEDICINE

## 2022-11-30 RX ORDER — IPRATROPIUM BROMIDE 0.5 MG/2.5ML
0.5 SOLUTION RESPIRATORY (INHALATION)
Qty: 180 EACH | Refills: 3 | Status: SHIPPED | OUTPATIENT
Start: 2022-11-30

## 2022-11-30 NOTE — PROGRESS NOTES
Roberto Carlos Huang presents today for   Chief Complaint   Patient presents with    Asthma     Follow upf rom 9/19/2022    Bronchiectasis    Cough    Results     Labs 9/21/2022       Is someone accompanying this pt? No    Is the patient using any DME equipment during OV? Yes. Nebulizer & flutter device    -DME Company N/A    Depression Screening:  3 most recent PHQ Screens 10/12/2022   Little interest or pleasure in doing things Not at all   Feeling down, depressed, irritable, or hopeless Not at all   Total Score PHQ 2 0       Learning Assessment:  Learning Assessment 4/20/2022   PRIMARY LEARNER Patient   HIGHEST LEVEL OF EDUCATION - PRIMARY LEARNER  SOME COLLEGE   BARRIERS PRIMARY LEARNER COGNITIVE   CO-LEARNER CAREGIVER No   PRIMARY LANGUAGE ENGLISH    NEED No   LEARNER PREFERENCE PRIMARY READING   LEARNING SPECIAL TOPICS No   ANSWERED BY patient   RELATIONSHIP SELF       Abuse Screening:  Abuse Screening Questionnaire 4/20/2022   Do you ever feel afraid of your partner? N   Are you in a relationship with someone who physically or mentally threatens you? N   Is it safe for you to go home? Y       Fall Risk  No flowsheet data found. Coordination of Care:  1. Have you been to the ER, urgent care clinic since your last visit? Hospitalized since your last visit? No    2. Have you seen or consulted any other health care providers outside of the [address] [place] [address] since your last visit? Include any pap smears or colon screening. Yes.  Dr. Luann Metcalf, ENT

## 2022-11-30 NOTE — LETTER
11/30/2022    Patient: Sully Carty   YOB: 1995   Date of Visit: 11/30/2022     Vinicius Beach MD  [place] [address]  Via In [place], [address] [address]  Via In Basket    Dear MD Tommy Young DO,      Thank you for referring Ms. Melissa Guerra to [address] [place] [address] for evaluation. My notes for this consultation are attached. If you have questions, please do not hesitate to call me. I look forward to following your patient along with you.       Sincerely,    Romana Smith MD

## 2022-11-30 NOTE — PROGRESS NOTES
[address] [place] [address]  [phone]    [facility]  Pulmonary, Critical Care, and Sleep Medicine    Pulmonary Office Followup  Name: Emily Pisano 33 y.o. female  MRN: [id number]  : 1995  Service Date: 22  Chief Complaint:   Chief Complaint   Patient presents with    Asthma     Follow upf rom 2022    Bronchiectasis    Cough    Results     Labs 2022       History of Present Illness:  Emily Pisano is a 33 y.o. female, who presents to Pulmonary clinic for followup of bronchiectasis and SOB. Patient was last seen in our clinic on 22. In the interval, patient was diagnosed with Pseudomonas LRTI, attempted eradication with Levaquin, however failed due to myalgias. Patient saw ID, declined KRYSTIN nebs, received course of gentamicin IV via PICC line, PICC line removed today. Last sputum culture sent to [facility], obtained on 11/15/2022, reported as normal jersey. Reports using airway clearance on some days -- not consistent. Pt reports worsening SOB -- had to use breathing treatments at work. Compliant with breztri  Lightheadedness for the last week --- worse when moving head  Pt also having headaches and associated with nausea      Past Medical History:   Diagnosis Date    ADD (attention deficit disorder)     Asthma     Headache      Past Surgical History:   Procedure Laterality Date    HX HEENT Right     \"skin graft in ear drum\" tympanoplasty    IR INJ SINUS TRACT THERAP Bilateral 2019    Removal of sinus tissue to widen cavity.      Family History   Problem Relation Age of Onset    Psychiatric Disorder Mother     Hypertension Maternal Grandmother     Glaucoma Maternal Grandmother     Cancer Maternal Grandfather     Cancer Paternal Grandmother     Parkinson's Disease Paternal Grandfather      Social History     Socioeconomic History    Marital status: SINGLE     Spouse name: Not on file    Number of children: Not on file Years of education: Not on file    Highest education level: Not on file   Occupational History    Not on file   Tobacco Use    Smoking status: Never    Smokeless tobacco: Never   Vaping Use    Vaping Use: Never used   Substance and Sexual Activity    Alcohol use: Not Currently     Comment: rare    Drug use: No    Sexual activity: Not Currently     Partners: Male   Other Topics Concern    Not on file   Social History Narrative    Not on file     Social Determinants of Health     Financial Resource Strain: Not on file   Food Insecurity: Not on file   Transportation Needs: Not on file   Physical Activity: Not on file   Stress: Not on file   Social Connections: Not on file   Intimate Partner Violence: Not on file   Housing Stability: Not on file     Allergies   Allergen Reactions    Macrobid [Nitrofurantoin Monohyd/M-Cryst] Itching     Prior to Admission medications    Medication Sig Start Date End Date Taking? Authorizing Provider   ipratropium (ATROVENT) 0.02 % soln 2.5 mL by Nebulization route every four (4) hours as needed for Wheezing, Shortness of Breath or Respiratory Distress. MIX WITH ALBUTEROL 11/30/22  Yes Corrinne Augusta, MD   ondansetron hcl (Zofran) 4 mg tablet Take 1 Tablet by mouth every eight (8) hours as needed for Nausea or Vomiting. 10/6/22  Yes Corrinne Augusta, MD   sodium chloride (PulmosaL) 7 % nebulizer solution Use one vial (4mL) with albuterol via nebulizer four times per day. Dx: J47.0, J45.5, J40  Patient taking differently: 4 mL by Nebulization route four (4) times daily. Use one vial (4mL) with albuterol via nebulizer four times per day. Dx: J47.0, J45.5, J40 9/19/22  Yes Corrinne Augusta, MD   albuterol (PROVENTIL VENTOLIN) 2.5 mg /3 mL (0.083 %) nebu Use one vial with hypertonic saline neb, four times per day. Dx: J47.0, J45.5, J40 9/19/22  Yes Corrinne Augusta, MD   budesonide-glycopyr-formoterol Gopi Pat Aerosphere) 160-9-4.8 mcg/actuation HFAA Take 2 Puffs by inhalation two (2) times a day. Rinse and gargle after each use 7/28/22  Yes Justin Bynum MD   albuterol (PROVENTIL HFA, VENTOLIN HFA, PROAIR HFA) 90 mcg/actuation inhaler Take 2 Puffs by inhalation every six (6) hours as needed for Wheezing or Shortness of Breath. 6/8/22  Yes Edelmira Robison MD   albuterol (PROVENTIL VENTOLIN) 2.5 mg /3 mL (0.083 %) nebu 3 mL by Nebulization route every four (4) hours as needed (cough, chest tightness). 6/8/22  Yes Edelmira Robison MD   mupirocin [facility]) 2 % ointment apply by topical route 1/2 inch to benito med rinse bottle, rinse bilateral nares BID everyday 8/11/21  Yes Provider, Historical   LORazepam (ATIVAN) 1 mg tablet Take 1 mg by mouth daily. 5/4/22  Yes Provider, Historical   methylphenidate HCl (RITALIN) 10 mg tablet Take 10 mg by mouth two (2) times a day. Yes Provider, Historical   methylphenidate HCl (RITALIN) 5 mg tablet Take 5 mg by mouth two (2) times a day. Yes Provider, Historical   QUEtiapine (SEROquel) 50 mg tablet Take 50 mg by mouth nightly. 4/6/22  Yes Provider, Historical   norgestimate-ethinyl estradioL (ORTHO TRI-CYCLEN LO) 0.18/0.215/0.25 mg-25 mcg tab Take 1 Tablet by mouth daily. 3/7/22  Yes Edelmira Robison MD   budesonide (PULMICORT) 0.5 mg/2 mL nbsp 500 mcg by Nebulization route two (2) times a day. 12/19/19  Yes Provider, Historical   fexofenadine (ALLEGRA) 180 mg tablet Take 180 mg by mouth daily. Yes Provider, Historical   fluticasone propionate (FLONASE) 50 mcg/actuation nasal spray 1 spray each nostril daily as needed for nasal congestion. Patient taking differently: 2 Sprays by Both Nostrils route daily as needed. 1 spray each nostril daily as needed for nasal congestion. 2/5/20  Yes Edelmira Robison MD   montelukast (SINGULAIR) 10 mg tablet Take 1 Tab by mouth daily. 1/15/20  Yes Justin Bynum MD   inhalational spacing device (AEROCHAMBER Z-STAT PLUS) 1 Each by Does Not Apply route as needed for Wheezing (use with symbicort).  Dx. A72.9 1/15/20  Yes Gloria Schneider, Feng Morgan MD   ibuprofen (MOTRIN) 200 mg tablet Take  by mouth every six (6) hours as needed for Pain. Yes Provider, Historical   tobramycin, PF, (KRYSTIN) 300 mg/5 mL nebulizer solution 5 mL by Nebulization route two (2) times a day. Take for 28days on and then 28days off. Dx: G84.1; A49.8  Patient not taking: Reported on 11/30/2022 10/19/22   Merrill Coughlin MD   acyclovir (ZOVIRAX) 400 mg tablet TAKE 1 TABLET BY MOUTH THREE TIMES DAILY  Patient not taking: No sig reported 9/27/21   Yobani Redmond MD     Immunization History   Administered Date(s) Administered    COVID-19, [address] [facility], Primary or Immunocompromised, (age 18y+), IM, 100 mcg/0.5mL 01/08/2021, 02/05/2021, 12/08/2021    HPV (9-valent) 12/12/2017, 02/12/2018    HPV (Quad) 06/14/2018    Influenza, FLUARIX, FLULAVAL, FLUZONE (age 10 mo+) AND AFLURIA, (age 1 y+), PF, 0.5mL 08/22/2018, 08/26/2019, 12/21/2020, 09/29/2021, 11/18/2022    Pneumococcal Polysaccharide (PPSV-23) 08/14/2020    Tdap 01/20/2021       Review of Systems:  A complete review of systems was performed as stated in the HPI, all others are negative. Objective:    Physical Exam:  /76 (BP 1 Location: Left upper arm, BP Patient Position: Sitting, BP Cuff Size: Adult long)   Pulse 68   Temp 98.2 °F (36.8 °C) (Temporal)   Resp 16   Ht 5' (1.524 m)   Wt 67.4 kg (148 lb 11.2 oz)   SpO2 99%   BMI 29.04 kg/m²   Vitals were personally reviewed  Gen: no acute distress, pleasant and cooperative, sitting up in chair, ambulates without difficulty  HEENT: normocephalic/atraumatic, no ocular drainage, EOMI, no scleral icterus, nasal bridge midline, unable to assess nasal and oral cavities due to patient wearing mask in the setting of COVID-19 pandemic  CVS: regular rate rhythm, S1/S2, no murmurs/rubs/gallops  Lungs: Poor air entry B/L, CTABL, no wheezes/rales/rhonchi  Psych: normal memory, thought content, and processing    Labs:   I have reviewed the patient's available labs  --  Lab Results   Component Value Date/Time    WBC 7.6 10/12/2022 12:00 AM    HGB 12.4 10/12/2022 12:00 AM    HCT 36.9 10/12/2022 12:00 AM    PLATELET 076 26/32/4048 12:00 AM    MCV 85 10/12/2022 12:00 AM   Peripheral eosinophil count 200  Lab Results   Component Value Date/Time    Immunoglobulin E 36 07/28/2022 12:00 AM    Immunoglobulin E 9 07/24/2019 12:00 AM   ANCA panel negative    Imaging:  I have personally reviewed patient's imaging -- No new imaging in the interval  **CT chest done with MRI CT diagnostics on 8/25/2022 reviewed on disc, shows clear lung fields bilaterally without infiltrate. In bases, patient has bronchiectasis seen in lower lobes, left greater than right with a few scattered groundglass opacities. No masses, consolidations, effusions were seen. Official report was scanned into [facility]    PFTs: 7/24/2019--spirometry is normal, but shows a reduced FEV1 at 75%. No BD response. TTE:  I have reviewed the patient's TTE results  09/07/22    ECHO ADULT COMPLETE 09/07/2022 9/7/2022    Interpretation Summary  Formatting of this result is different from the original.      Left Ventricle: Normal left ventricular systolic function with a visually estimated EF of 55 - 60%. Left ventricle is smaller than normal. Increased wall thickness. Findings consistent with mild concentric hypertrophy. Normal wall motion. Normal diastolic function. Signed by: Kelley Morrissey DO on 9/7/2022  4:15 PM        Assessment and Plan:  33 y.o. female with:    Impression:  1. Bronchiectasis, non-CF (negative genetic testing): Etiology of bronchiectasis unclear at this point, ANCA panel, CF genetic testing, QuIGs all negative. Pt was treated for Pseudomonas LRTI, initially levaquin, unable to tolerate, so pt underwent IV gentamicin therapy, recently completed (PICC removed today) -- follows with ID, Dr. Scott Zelaya  2.   Dyspnea on exertion/SOB:  Etiology unclear, appears to be a combination of bronchospasm along with deconditioning  3. Not well controlled severe persistent asthma  4. Chronic cough: Etiology multifactorial, due to above --- suspect bronchiectasis to be the main   5. Chronic rhinitis  6. 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 Plan:  -Given ongoing dyspnea, pt declines prednisone taper. Will obtain CPET testing to rule out any underlying CV disease  -Management of pseudomonas resp infection per ID. Pt declined natalya nebs for suppression  -Counseled regarding compliance with regards to airway clearance with:  --Flutter device 2-4 times daily  --7% hypertonic saline nebs 2-4 times daily (to be done with albuterol)  --Albuterol nebs 2-4 times daily  --We will not do Mucinex since patient did not have any change to sputum previously  -Continue Breztri 2 puffs twice daily with spacer. Counseled patient to rinse mouth thoroughly after each use  -Management of sinus disease per ENT  -Continue Singulair 10 mg nightly  -Continue albuterol HFA as needed. Counseled patient to premedicate prior to exercise.  -Add ipratropium nebs to albuterol nebs for acute dyspnea  -Counseled patient avoid potential triggers of asthma.  -Counseled patient regarding poor medication compliance  -Continue Flonase  -Advised patient remain active  -Immunizations reviewed, COVID, pneumococcal vaccinations up-to-date      Follow-up and Dispositions    Return in about 2 months (around 1/30/2023).        Orders Placed This Encounter    RT--PULMONARY STRESS TEST COMPLEX    ipratropium (ATROVENT) 0.02 % stuart Whatley MD/MPH     Pulmonary, Critical Care Medicine  [facility]

## 2022-12-01 ENCOUNTER — TELEPHONE (OUTPATIENT)
Dept: INFECTIOUS DISEASES | Age: 27
End: 2022-12-01

## 2022-12-01 NOTE — TELEPHONE ENCOUNTER
Patient seen and examined in ID OP clinic on 12/1/22. Completed IV gentamicin as of 11/29/22  PICC line removed. No further work-associated restrictions. Discussed with Dr. Lila Taylor  Has cardiopulmonary stress test pending. Will review when available and discuss with pulmonary. Reviewed recent sputum cultures and labs with patient. All questions answered.      RTC PRN

## 2023-01-10 PROBLEM — J18.9 PNEUMONIA, UNSPECIFIED ORGANISM: Status: ACTIVE | Noted: 2019-06-05

## 2023-01-10 PROBLEM — T78.40XA ALLERGY, UNSPECIFIED, INITIAL ENCOUNTER: Status: ACTIVE | Noted: 2019-01-06

## 2023-01-10 RX ORDER — METHOCARBAMOL 500 MG/1
TABLET, FILM COATED ORAL
COMMUNITY
Start: 2022-12-01 | End: 2023-01-23

## 2023-01-10 RX ORDER — DICLOFENAC SODIUM 50 MG/1
TABLET, DELAYED RELEASE ORAL
COMMUNITY
Start: 2022-12-01 | End: 2023-01-23

## 2023-01-18 PROBLEM — R06.02 SHORTNESS OF BREATH: Status: ACTIVE | Noted: 2023-01-12

## 2023-01-23 ENCOUNTER — OFFICE VISIT (OUTPATIENT)
Dept: PULMONOLOGY | Age: 28
End: 2023-01-23
Payer: COMMERCIAL

## 2023-01-23 VITALS
DIASTOLIC BLOOD PRESSURE: 77 MMHG | HEIGHT: 60 IN | WEIGHT: 147 LBS | RESPIRATION RATE: 16 BRPM | HEART RATE: 92 BPM | SYSTOLIC BLOOD PRESSURE: 111 MMHG | BODY MASS INDEX: 28.86 KG/M2 | OXYGEN SATURATION: 98 % | TEMPERATURE: 98.2 F

## 2023-01-23 DIAGNOSIS — R06.09 DYSPNEA ON EXERTION: Primary | ICD-10-CM

## 2023-01-23 DIAGNOSIS — J45.50 SEVERE PERSISTENT ASTHMA WITHOUT COMPLICATION: ICD-10-CM

## 2023-01-23 DIAGNOSIS — J47.9 BRONCHIECTASIS WITHOUT COMPLICATION (HCC): ICD-10-CM

## 2023-01-23 DIAGNOSIS — R53.81 PHYSICAL DECONDITIONING: ICD-10-CM

## 2023-01-23 PROCEDURE — 99214 OFFICE O/P EST MOD 30 MIN: CPT | Performed by: INTERNAL MEDICINE

## 2023-01-23 RX ORDER — TRIAMTERENE/HYDROCHLOROTHIAZID 37.5-25 MG
1 TABLET ORAL DAILY
COMMUNITY
Start: 2023-01-13

## 2023-01-23 NOTE — PROGRESS NOTES
[address] [place] [address]  [phone]    [facility]  Pulmonary, Critical Care, and Sleep Medicine    Pulmonary Office Followup  Name: Lore Klein 33 y.o. female  MRN: [id number]  : 1995  Service Date: 23  Chief Complaint:   Chief Complaint   Patient presents with    Bronchiectasis     Follow upf rom 2022    Asthma    Other     IRAHETA, physical deconditioning, pseudomonas aeruginosa infection    Results     CPET 2023 Richelle Stark)       History of Present Illness:  Lore Klein is a 33 y.o. female, who presents to Pulmonary clinic for followup of bronchiectasis and SOB. Patient was last seen in our clinic on 22. Pt reports ongoing dyspnea. Worse in the mornings, shortly after waking up. Pt wonders if it her meds -- pt on Seroquel at night (now weaned to lowest dose). Sx last about 30min to 1 hour and then improved. 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 Reports no issues with sputum production currently  Patient had CPET testing done in the interval    Past Medical History:   Diagnosis Date    ADD (attention deficit disorder)     Asthma     Headache      Past Surgical History:   Procedure Laterality Date    HX HEENT Right     \"skin graft in ear drum\" tympanoplasty    IR INJ SINUS TRACT THERAP Bilateral 2019    Removal of sinus tissue to widen cavity.      Family History   Problem Relation Age of Onset    Psychiatric Disorder Mother     Hypertension Maternal Grandmother     Glaucoma Maternal Grandmother     Cancer Maternal Grandfather     Cancer Paternal Grandmother     Parkinson's Disease Paternal Grandfather      Social History     Socioeconomic History    Marital status: SINGLE     Spouse name: Not on file    Number of children: Not on file    Years of education: Not on file    Highest education level: Not on file   Occupational History    Not on file   Tobacco Use    Smoking status: Never    Smokeless tobacco: Never   Vaping Use    Vaping Use: Never used   Substance and Sexual Activity    Alcohol use: Not Currently     Comment: rare    Drug use: No    Sexual activity: Not Currently     Partners: Male   Other Topics Concern    Not on file   Social History Narrative    Not on file     Social Determinants of Health     Financial Resource Strain: Not on file   Food Insecurity: Not on file   Transportation Needs: Not on file   Physical Activity: Not on file   Stress: Not on file   Social Connections: Not on file   Intimate Partner Violence: Not on file   Housing Stability: Not on file     Allergies   Allergen Reactions    Macrobid [Nitrofurantoin Monohyd/M-Cryst] Itching     Prior to Admission medications    Medication Sig Start Date End Date Taking? Authorizing Provider   triamterene-hydroCHLOROthiazide (MAXZIDE) 37.5-25 mg per tablet Take 1 Tablet by mouth daily. 1/13/23  Yes Provider, Historical   ipratropium (ATROVENT) 0.02 % soln 2.5 mL by Nebulization route every four (4) hours as needed for Wheezing, Shortness of Breath or Respiratory Distress. MIX WITH ALBUTEROL 11/30/22  Yes Claudine Ayoub MD   ondansetron hcl (Zofran) 4 mg tablet Take 1 Tablet by mouth every eight (8) hours as needed for Nausea or Vomiting. 10/6/22  Yes Claudine Ayoub MD   sodium chloride (PulmosaL) 7 % nebulizer solution Use one vial (4mL) with albuterol via nebulizer four times per day. Dx: J47.0, J45.5, J40  Patient taking differently: 4 mL by Nebulization route four (4) times daily. Use one vial (4mL) with albuterol via nebulizer four times per day. Dx: J47.0, J45.5, J40 9/19/22  Yes Claudine Ayoub MD   albuterol (PROVENTIL VENTOLIN) 2.5 mg /3 mL (0.083 %) nebu Use one vial with hypertonic saline neb, four times per day.  Dx: J47.0, J45.5, J40 9/19/22  Yes Deanna Brown MD   budesonide-glycopyr-formoterol Angelita Brought [employer]) 160-9-4.8 mcg/actuation HFAA Take 2 Puffs by inhalation two (2) times a day. Rinse and gargle after each use 7/28/22  Yes Deanna Brown MD   albuterol (PROVENTIL HFA, VENTOLIN HFA, PROAIR HFA) 90 mcg/actuation inhaler Take 2 Puffs by inhalation every six (6) hours as needed for Wheezing or Shortness of Breath. 6/8/22  Yes Swapna Slaughter MD   albuterol (PROVENTIL VENTOLIN) 2.5 mg /3 mL (0.083 %) nebu 3 mL by Nebulization route every four (4) hours as needed (cough, chest tightness). 6/8/22  Yes Swapna Slaughter MD   mupirocin [facility]) 2 % ointment apply by topical route 1/2 inch to benito med rinse bottle, rinse bilateral nares BID everyday 8/11/21  Yes Provider, Historical   LORazepam (ATIVAN) 1 mg tablet Take 1 mg by mouth daily. 5/4/22  Yes Provider, Historical   methylphenidate HCl (RITALIN) 10 mg tablet Take 10 mg by mouth two (2) times a day. Yes Provider, Historical   methylphenidate HCl (RITALIN) 5 mg tablet Take 5 mg by mouth two (2) times a day. Yes Provider, Historical   QUEtiapine (SEROquel) 50 mg tablet Take 50 mg by mouth nightly. 4/6/22  Yes Provider, Historical   norgestimate-ethinyl estradioL (ORTHO TRI-CYCLEN LO) 0.18/0.215/0.25 mg-25 mcg tab Take 1 Tablet by mouth daily. 3/7/22  Yes Swapna Slaughter MD   budesonide (PULMICORT) 0.5 mg/2 mL nbsp 500 mcg by Nebulization route two (2) times a day. 12/19/19  Yes Provider, Historical   fexofenadine (ALLEGRA) 180 mg tablet Take 180 mg by mouth daily. Yes Provider, Historical   fluticasone propionate (FLONASE) 50 mcg/actuation nasal spray 1 spray each nostril daily as needed for nasal congestion. Patient taking differently: 2 Sprays by Both Nostrils route daily as needed. 1 spray each nostril daily as needed for nasal congestion. 2/5/20  Yes Swapna Slaughter MD   montelukast (SINGULAIR) 10 mg tablet Take 1 Tab by mouth daily.  1/15/20  Yes [facility], Claire Bermeo MD   inhalational spacing device (AEROCHAMBER Z-STAT PLUS) 1 Each by Does Not Apply route as needed for Wheezing (use with symbicort). Dx. J45.5 1/15/20  Yes Milka Brand MD   ibuprofen (MOTRIN) 200 mg tablet Take  by mouth every six (6) hours as needed for Pain. Yes Provider, Historical   tobramycin, PF, (KRYSTIN) 300 mg/5 mL nebulizer solution 5 mL by Nebulization route two (2) times a day. Take for 28days on and then 28days off. Dx: M23.6; A49.8  Patient not taking: Reported on 1/23/2023 10/19/22   Milka Brand MD   acyclovir (ZOVIRAX) 400 mg tablet TAKE 1 TABLET BY MOUTH THREE TIMES DAILY  Patient not taking: No sig reported 9/27/21   Yu Sol MD     Immunization History   Administered Date(s) Administered    COVID-19, [address], Primary or Immunocompromised, (age 18y+), IM, 100 mcg/0.5mL 01/08/2021, 02/05/2021, 12/08/2021    HPV (9-valent) 12/12/2017, 02/12/2018    HPV (Quad) 06/14/2018    Influenza, FLUARIX, FLULAVAL, FLUZONE (age 10 mo+) AND AFLURIA, (age 1 y+), PF, 0.5mL 08/22/2018, 08/26/2019, 12/21/2020, 09/29/2021, 11/18/2022    Pneumococcal Polysaccharide (PPSV-23) 08/14/2020    Tdap 01/20/2021       Review of Systems:  A complete review of systems was performed as stated in the HPI, all others are negative.       Objective:    Physical Exam:  /77 (BP 1 Location: Left upper arm, BP Patient Position: Sitting, BP Cuff Size: Large adult)   Pulse 92   Temp 98.2 °F (36.8 °C) (Temporal)   Resp 16   Ht 5' (1.524 m)   Wt 66.7 kg (147 lb)   LMP 01/09/2023   SpO2 98%   BMI 28.71 kg/m²   Vitals were personally reviewed  Gen: no acute distress, pleasant and cooperative, sitting up in chair, ambulates without difficulty  HEENT: normocephalic/atraumatic, no ocular drainage, EOMI, no scleral icterus, nasal bridge midline, unable to assess nasal and oral cavities due to patient wearing mask in the setting of COVID-19 pandemic  CVS: regular rate rhythm, S1/S2, no murmurs/rubs/gallops  Lungs: fair air entry B/L, CTABL, no wheezes/rales/rhonchi  Psych: normal memory, thought content, and processing    Labs: I have reviewed the patient's available labs  --  Lab Results   Component Value Date/Time    WBC 7.6 10/12/2022 12:00 AM    HGB 12.4 10/12/2022 12:00 AM    HCT 36.9 10/12/2022 12:00 AM    PLATELET 683 83/91/0652 12:00 AM    MCV 85 10/12/2022 12:00 AM   Peripheral eosinophil count 200  Lab Results   Component Value Date/Time    Immunoglobulin E 36 07/28/2022 12:00 AM    Immunoglobulin E 9 07/24/2019 12:00 AM   ANCA panel negative    Imaging:  I have personally reviewed patient's imaging -- No new imaging in the interval  **CT chest done with MRI CT diagnostics on 8/25/2022 reviewed on disc, shows clear lung fields bilaterally without infiltrate. In bases, patient has bronchiectasis seen in lower lobes, left greater than right with a few scattered groundglass opacities. No masses, consolidations, effusions were seen. Official report was scanned into [facility]    PFTs: 7/24/2019--spirometry is normal, but shows a reduced FEV1 at 75%. No BD response. TTE:  I have reviewed the patient's TTE results  09/07/22    ECHO ADULT COMPLETE 09/07/2022 9/7/2022    Interpretation Summary  Formatting of this result is different from the original.      Left Ventricle: Normal left ventricular systolic function with a visually estimated EF of 55 - 60%. Left ventricle is smaller than normal. Increased wall thickness. Findings consistent with mild concentric hypertrophy. Normal wall motion. Normal diastolic function. Signed by: Mario Garcia DO on 9/7/2022  4:15 PM      Cardiopulmonary exercise test done at [facility] on 1/11/2023, read by Dr. Ana Courtney, notes the patient has mild cardiovascular limitation, deconditioning is possible.   Of note, VO2 max is mildly reduced with minimal cardiovascular reserve at the end of exercise, baseline MVV was mildly reduced but ventilatory reserve is adequate at the end of exercise. No significant drop in spirometry following exercise, though noted that slow incremental exercise protocol is not designed to assess for exercise-induced bronchospasm. Assessment and Plan:  33 y.o. female with:    Impression:  1. Dyspnea on exertion/SOB:  Etiology multifactorial.  Pt underwent CPET testing on 1/11/23, findings consistent with deconditioning, possible mild cardiac limitation. Patient only has some mild contribution from bronchospasm  2. Fairly well controlled severe persistent asthma  3. Bronchiectasis, non-CF (negative genetic testing): Etiology of bronchiectasis idiopathic, ANCA panel, CF genetic testing, QuIGs all negative. Pt was treated for Pseudomonas LRTI, initially levaquin, unable to tolerate, so pt underwent IV gentamicin therapy, completed with negative sputum afterwards -- followed with ID, Dr. Genaro Ibarra  4. Chronic cough: Etiology multifactorial, due to above --- suspect bronchiectasis to be the main   5. Chronic rhinitis  6. 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 7.  Deconditioning    Plan:  -Reviewed CPET testing in clinic with pt. Will refer pt to Cardiology given mild cardiovascular limitation (VO2 max is mildly reduced with minimal cardiovascular reserve at the end of exercise). Pt will call our office with her choice of provider -- wants to go to [facility] since she will be working with there in her new job  -Counseled regarding compliance with regards to airway clearance with:  --Flutter device 2-4 times daily  --7% hypertonic saline nebs 2-4 times daily (to be done with albuterol)  --Albuterol nebs 2-4 times daily  --We will not do Mucinex since patient did not have any change to sputum previously  -Continue Breztri 2 puffs twice daily with spacer.   Counseled patient to rinse mouth thoroughly after each use  -Management of sinus disease per ENT  -Continue Singulair 10 mg nightly  -Continue albuterol HFA as needed. Premedicate prior to exercise.  -Continue ipratropium / albuterol nebs for acute dyspnea  -Counseled patient avoid potential triggers of asthma.  -Continue Flonase  -Advised patient remain active  -Immunizations reviewed, COVID, pneumococcal vaccinations up-to-date      Follow-up and Dispositions    Return in about 6 months (around 7/23/2023).        Orders Placed This Encounter    REFERRAL TO CARDIOLOGY       Gema Pierre MD/MPH     Pulmonary, Critical Care Medicine  [facility]

## 2023-01-23 NOTE — LETTER
1/23/2023    Patient: Mabel Reddy   YOB: 1995   Date of Visit: 1/23/2023     Pancho Neri MD  [place] [address]  [place] [place] [address]  [facility] [address]    Dear MD Alison Gu Figures, DO,      Thank you for referring Ms. Ann Rider to [address] [place] [address] for evaluation. My notes for this consultation are attached. If you have questions, please do not hesitate to call me. I look forward to following your patient along with you.       Sincerely,    Radha De La Rosa MD

## 2023-01-23 NOTE — PROGRESS NOTES
Pastor Palacios presents today for   Chief Complaint   Patient presents with    Bronchiectasis     Follow upf rom 11/30/2022    Asthma    Other     IRAHETA, physical deconditioning, pseudomonas aeruginosa infection    Results     CPET 1/11/2023 Eren Mcwilliams)       Is someone accompanying this pt? No    Is the patient using any DME equipment during OV? Yes. Flutter, nebulizer & spacer   -DME Company N/A    Depression Screening:  3 most recent PHQ Screens 1/23/2023   Little interest or pleasure in doing things Not at all   Feeling down, depressed, irritable, or hopeless Not at all   Total Score PHQ 2 0       Learning Assessment:  Learning Assessment 4/20/2022   PRIMARY LEARNER Patient   HIGHEST LEVEL OF EDUCATION - PRIMARY LEARNER  SOME COLLEGE   BARRIERS PRIMARY LEARNER COGNITIVE   CO-LEARNER CAREGIVER No   PRIMARY LANGUAGE ENGLISH    NEED No   LEARNER PREFERENCE PRIMARY READING   LEARNING SPECIAL TOPICS No   ANSWERED BY patient   RELATIONSHIP SELF       Abuse Screening:  Abuse Screening Questionnaire 4/20/2022   Do you ever feel afraid of your partner? N   Are you in a relationship with someone who physically or mentally threatens you? N   Is it safe for you to go home? Y       Fall Risk  No flowsheet data found. Coordination of Care:  1. Have you been to the ER, urgent care clinic since your last visit? Hospitalized since your last visit? No    2. Have you seen or consulted any other health care providers outside of the [address] [place] [address] since your last visit? Include any pap smears or colon screening. Yes.  Dr. Swapna Ortiz, ENT

## 2023-02-13 DIAGNOSIS — R06.09 DYSPNEA ON EXERTION: Primary | ICD-10-CM

## 2023-02-13 DIAGNOSIS — J45.50 SEVERE PERSISTENT ASTHMA WITHOUT COMPLICATION: ICD-10-CM

## 2023-02-13 DIAGNOSIS — J47.9 BRONCHIECTASIS WITHOUT COMPLICATION (HCC): ICD-10-CM

## 2023-03-08 NOTE — TELEPHONE ENCOUNTER
Requested Prescriptions     Pending Prescriptions Disp Refills    TRI-LO-SPRINTEC 0.18/0.215/0.25 MG-25 MCG TABS [Pharmacy Med Name: TRI-LO-SPRINTEC TABLETS 28S] 84 tablet      Sig: TAKE 1 TABLET BY MOUTH DAILY     Last OV: 10/12/2022  Last labs: 11/28/2022  Next OV: 4/26/2023

## 2023-03-09 RX ORDER — NORGESTIMATE AND ETHINYL ESTRADIOL
KIT
Qty: 84 TABLET | Refills: 0 | Status: SHIPPED | OUTPATIENT
Start: 2023-03-09

## 2023-05-04 ENCOUNTER — TELEPHONE (OUTPATIENT)
Age: 28
End: 2023-05-04

## 2023-05-04 DIAGNOSIS — J47.0 BRONCHIECTASIS WITH ACUTE LOWER RESPIRATORY INFECTION (HCC): Primary | ICD-10-CM

## 2023-05-06 ENCOUNTER — HOSPITAL ENCOUNTER (OUTPATIENT)
Facility: HOSPITAL | Age: 28
Discharge: HOME | End: 2023-05-09

## 2023-05-06 LAB — SENTARA SPECIMEN COLLECTION: NORMAL

## 2023-05-06 PROCEDURE — 99001 SPECIMEN HANDLING PT-LAB: CPT

## 2023-05-10 ENCOUNTER — TELEPHONE (OUTPATIENT)
Age: 28
End: 2023-05-10

## 2023-05-10 DIAGNOSIS — J47.0 BRONCHIECTASIS WITH ACUTE LOWER RESPIRATORY INFECTION (HCC): Primary | ICD-10-CM

## 2023-05-10 DIAGNOSIS — Z22.39 PSEUDOMONAS AERUGINOSA COLONIZATION: ICD-10-CM

## 2023-05-10 DIAGNOSIS — A49.8 PSEUDOMONAS AERUGINOSA INFECTION: ICD-10-CM

## 2023-05-10 LAB — RESULT: ABNORMAL

## 2023-05-10 RX ORDER — SODIUM CHLORIDE FOR INHALATION 3.5 %
VIAL, NEBULIZER (ML) INHALATION
Qty: 1000 ML | Refills: 11 | Status: SHIPPED | OUTPATIENT
Start: 2023-05-10

## 2023-05-10 RX ORDER — TOBRAMYCIN INHALATION SOLUTION 300 MG/5ML
300 INHALANT RESPIRATORY (INHALATION) 2 TIMES DAILY
Qty: 300 ML | Refills: 6 | Status: SHIPPED | OUTPATIENT
Start: 2023-05-10 | End: 2023-06-09

## 2023-05-10 NOTE — TELEPHONE ENCOUNTER
Called pt regarding sputum cx growing pseudomonas. Discussed with ID, will refer back to Dr. Tammie Salazar.   Start pt on parker nebs one month on/off, and continue hypertonic saline nebs for airway clearance      Sumeet Ojeda MD/MPH     Pulmonary, Critical Care Medicine  [facility]

## 2023-05-17 NOTE — TELEPHONE ENCOUNTER
Patient states she got notice from [facility] they are not able to fill her insurance medication Renzo. I called [facility] and they also can not fill  Card in system is Juan Chiang but when [employer] ran it is not active and gave [facility] [employer].  [phone]  Pt ID# [id number]  BIN [id number]  Person code 001  [facility]

## 2023-05-18 RX ORDER — TOBRAMYCIN INHALATION SOLUTION 300 MG/5ML
300 INHALANT RESPIRATORY (INHALATION) 2 TIMES DAILY
Qty: 300 ML | Refills: 6 | Status: SHIPPED | OUTPATIENT
Start: 2023-05-18 | End: 2023-06-17

## 2023-08-21 NOTE — PROGRESS NOTES
1. \"Have you been to the ER, urgent care clinic since your last visit? Hospitalized since your last visit? \" No    2. \"Have you seen or consulted any other health care providers outside of the [address] [place] [address] since your last visit? \" Yes When: Dr. Bennett Contreras ENT 1/13/23. 3. For patients aged 43-73: Has the patient had a colonoscopy / FIT/ Cologuard? NA - based on age      If the patient is female:    4. For patients aged 43-66: Has the patient had a mammogram within the past 2 years? NA - based on age or sex      11. For patients aged 21-65: Has the patient had a pap smear? Yes - Care Gap present.  Most recent result on file 8/14/2020    Chief Complaint   Patient presents with    Annual Exam

## 2023-08-22 ENCOUNTER — OFFICE VISIT (OUTPATIENT)
Age: 28
End: 2023-08-22
Payer: COMMERCIAL

## 2023-08-22 VITALS
DIASTOLIC BLOOD PRESSURE: 68 MMHG | HEIGHT: 60 IN | HEART RATE: 73 BPM | BODY MASS INDEX: 27.52 KG/M2 | TEMPERATURE: 96.8 F | SYSTOLIC BLOOD PRESSURE: 108 MMHG | RESPIRATION RATE: 16 BRPM | WEIGHT: 140.2 LBS | OXYGEN SATURATION: 97 %

## 2023-08-22 DIAGNOSIS — Z00.00 ENCOUNTER FOR WELL ADULT EXAM WITHOUT ABNORMAL FINDINGS: Primary | ICD-10-CM

## 2023-08-22 DIAGNOSIS — Z12.4 SCREENING FOR CERVICAL CANCER: ICD-10-CM

## 2023-08-22 DIAGNOSIS — J45.20 MILD INTERMITTENT ASTHMA WITHOUT COMPLICATION: ICD-10-CM

## 2023-08-22 PROCEDURE — 99395 PREV VISIT EST AGE 18-39: CPT | Performed by: FAMILY MEDICINE

## 2023-08-22 RX ORDER — LAMOTRIGINE 100 MG/1
0.5 TABLET ORAL DAILY
COMMUNITY
Start: 2023-08-16

## 2023-08-22 SDOH — ECONOMIC STABILITY: FOOD INSECURITY: WITHIN THE PAST 12 MONTHS, YOU WORRIED THAT YOUR FOOD WOULD RUN OUT BEFORE YOU GOT MONEY TO BUY MORE.: NEVER TRUE

## 2023-08-22 SDOH — ECONOMIC STABILITY: HOUSING INSECURITY
IN THE LAST 12 MONTHS, WAS THERE A TIME WHEN YOU DID NOT HAVE A STEADY PLACE TO SLEEP OR SLEPT IN A SHELTER (INCLUDING NOW)?: NO

## 2023-08-22 SDOH — ECONOMIC STABILITY: FOOD INSECURITY: WITHIN THE PAST 12 MONTHS, THE FOOD YOU BOUGHT JUST DIDN'T LAST AND YOU DIDN'T HAVE MONEY TO GET MORE.: NEVER TRUE

## 2023-08-22 SDOH — ECONOMIC STABILITY: INCOME INSECURITY: HOW HARD IS IT FOR YOU TO PAY FOR THE VERY BASICS LIKE FOOD, HOUSING, MEDICAL CARE, AND HEATING?: NOT HARD AT ALL

## 2023-08-22 ASSESSMENT — PATIENT HEALTH QUESTIONNAIRE - PHQ9
SUM OF ALL RESPONSES TO PHQ QUESTIONS 1-9: 0
SUM OF ALL RESPONSES TO PHQ QUESTIONS 1-9: 0
2. FEELING DOWN, DEPRESSED OR HOPELESS: 0
SUM OF ALL RESPONSES TO PHQ QUESTIONS 1-9: 0
SUM OF ALL RESPONSES TO PHQ9 QUESTIONS 1 & 2: 0
SUM OF ALL RESPONSES TO PHQ QUESTIONS 1-9: 0
1. LITTLE INTEREST OR PLEASURE IN DOING THINGS: 0

## 2023-08-22 NOTE — PROGRESS NOTES
Well Adult Note  Name: [facility] Date: 2023   MRN: [id number] Sex: Female   Age: 33 y.o. Ethnicity: Non- / Non    : 1995 Race: White (non-)      Chiquita Toth is here for well adult exam.  History:  Here for physical.  Irregular menstrual cycle. Does not want to take ocps. Does self breast Exam.  No concern. Sexually active. One partner. No h/o pelvic pain or vaginal discharge. No h/o abnormal pap smear. No h/o std. No family history of breast cancer, cervical cancer, ovarian cancer or colon cancer    Review of Systems:Denies any headache, dizziness, no chest pain or trouble breathing, no arm or leg weakness. No nausea or vomiting, no weight or appetite changes, no mood changes . No urine or bowel complains, no palpitation, no diaphoresis. No abdominal pain. No cold or cough. No leg swelling. No fever. No sleep trouble. Allergies   Allergen Reactions    Nitrofurantoin Itching         Prior to Visit Medications    Medication Sig Taking? Authorizing Provider   lamoTRIgine (LAMICTAL) 100 MG tablet Take 0.5 tablets by mouth daily Yes Historical Provider, MD   Sodium Chloride (HYPERSAL) 3.5 % NEBU Use one vial with albuterol neb four times per day. Dx:47.9 Yes Kanwal Delatorre MD   acyclovir (ZOVIRAX) 400 MG tablet TAKE 1 TABLET BY MOUTH THREE TIMES DAILY Yes Ar Automatic Reconciliation   albuterol sulfate HFA (PROVENTIL;VENTOLIN;PROAIR) 108 (90 Base) MCG/ACT inhaler Inhale 2 puffs into the lungs every 6 hours as needed Yes Ar Automatic Reconciliation   albuterol (PROVENTIL) (2.5 MG/3ML) 0.083% nebulizer solution Use one vial with hypertonic saline neb, four times per day. Dx: J47.0, J45.5, J40 Yes Ar Automatic Reconciliation   fexofenadine (ALLEGRA) 180 MG tablet Take 1 tablet by mouth daily Yes Ar Automatic Reconciliation   fluticasone (FLONASE) 50 MCG/ACT nasal spray 1 spray each nostril daily as needed for nasal congestion.  Yes Ar Automatic Reconciliation

## 2023-08-23 ENCOUNTER — OFFICE VISIT (OUTPATIENT)
Age: 28
End: 2023-08-23
Payer: COMMERCIAL

## 2023-08-23 VITALS
HEIGHT: 60 IN | OXYGEN SATURATION: 96 % | RESPIRATION RATE: 16 BRPM | WEIGHT: 141.4 LBS | HEART RATE: 104 BPM | TEMPERATURE: 98.7 F | BODY MASS INDEX: 27.76 KG/M2 | DIASTOLIC BLOOD PRESSURE: 68 MMHG | SYSTOLIC BLOOD PRESSURE: 108 MMHG

## 2023-08-23 DIAGNOSIS — J47.9 BRONCHIECTASIS, UNCOMPLICATED (HCC): Primary | ICD-10-CM

## 2023-08-23 DIAGNOSIS — J45.50 SEVERE PERSISTENT ASTHMA, UNCOMPLICATED: ICD-10-CM

## 2023-08-23 DIAGNOSIS — Z22.39 PSEUDOMONAS AERUGINOSA COLONIZATION: ICD-10-CM

## 2023-08-23 DIAGNOSIS — R06.09 DYSPNEA ON EXERTION: ICD-10-CM

## 2023-08-23 PROCEDURE — 99214 OFFICE O/P EST MOD 30 MIN: CPT | Performed by: INTERNAL MEDICINE

## 2023-08-23 RX ORDER — LAMOTRIGINE 25 MG/1
TABLET ORAL
COMMUNITY
Start: 2023-06-22 | End: 2023-08-23
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
## 2023-08-23 NOTE — PROGRESS NOTES
Kaitlin Kee presents today for   Chief Complaint   Patient presents with    Asthma     Follow up from 1/23/2023    [facility]    Other     Dyspnea, bronchiectasis    Results     Rspiratory culture 5/5/2023 Nj Rae), CPET 1/12/2023 Chevy Bui)       Is someone accompanying this pt? No    Is the patient using any DME equipment during OV? Yes. Flutter device, nebulizer, spacer    -DME Company N/A    Depression Screening:    PHQ-9 Questionaire 8/22/2023   Little interest or pleasure in doing things 0   Feeling down, depressed, or hopeless 0   PHQ-9 Total Score 0       Learning Needs Questionnaire:     Who is the primary learner? Patient    What is the preferred language for health care of the primary learner? ENGLISH    How does the primary learner prefer to learn new concepts? READING    Answered By Patient    Relationship to Learner SELF          Fall Risk:     No flowsheet data found. Abuse Screening: AMB Abuse Screening 8/22/2023   Do you ever feel afraid of your partner? N   Are you in a relationship with someone who physically or mentally threatens you? N   Is it safe for you to go home? Y         Coordination of Care:    1. Have you been to the ER, urgent care clinic since your last visit? Hospitalized since your last visit? No    2. Have you seen or consulted any other health care providers outside of the [address] [place] [address] since your last visit? Include any pap smears or colon screening. Yes. Dr. Nikos Messina, ID, Dr. Gail Beasley, ENT    Medication list has been update per patient.

## 2023-08-25 LAB — PAP IMAGE GUIDED: NORMAL

## 2023-11-14 RX ORDER — SODIUM CHLORIDE FOR INHALATION 7 %
VIAL, NEBULIZER (ML) INHALATION
Qty: 480 ML | Refills: 2 | Status: SHIPPED | OUTPATIENT
Start: 2023-11-14

## 2023-12-01 ENCOUNTER — TELEPHONE (OUTPATIENT)
Age: 28
End: 2023-12-01

## 2023-12-01 NOTE — TELEPHONE ENCOUNTER
Spoke with Keyon Obando at [facility]  We did a PA for Tobramycin that came back drug already on formulary and no PA needed. The cost would be $255 for that generic. The PA needs to be for Althea instead and the patient has a patient assistance card that she will pay $4 for the medication. Pharmacist is faxing another form to complete the PA for name brand ALTHEA.

## 2023-12-01 NOTE — TELEPHONE ENCOUNTER
Lemuel Alonso from [facility] called about the prior auth for the pt's Renzo rx. Dr. Suresh Escalanteress for generic but the copay is $255. In order for insurance to fully cover, prior auth needs to be submitted for name brand.  Please advise [phone]

## 2023-12-04 ENCOUNTER — TELEPHONE (OUTPATIENT)
Age: 28
End: 2023-12-04

## 2023-12-04 NOTE — TELEPHONE ENCOUNTER
Patient calling re the generic tobramycin that is covered but too expensive. The PA for the Renzo name brand was denied by insurance.  She will call the insurance to see if they can help

## 2023-12-06 ENCOUNTER — TELEPHONE (OUTPATIENT)
Age: 28
End: 2023-12-06

## 2023-12-06 NOTE — TELEPHONE ENCOUNTER
Pt stated that her ins stated to her that our office can request a new auth. d/t her current one .  Please Advise [phone]

## 2023-12-12 ENCOUNTER — TELEPHONE (OUTPATIENT)
Age: 28
End: 2023-12-12

## 2023-12-12 NOTE — TELEPHONE ENCOUNTER
Patient understands the Tobramycin generic is covered by her insurance but too expensive for her. The Renzo we where asked to do a PA for was denied by her insurance due to the generic is covered.

## 2024-01-10 ENCOUNTER — TELEPHONE (OUTPATIENT)
Age: 29
End: 2024-01-10

## 2024-01-10 NOTE — TELEPHONE ENCOUNTER
Spoke with Bernice with [employer]. Clarified the issues the patient is having getting the Renzo filled. Generic was covered with PA but cost was $255. She has help with the brand name with [facility] so we did a PA for Brand but was denied due to they approved the generic. Bernice will reach out to the patient to see what she is to do re her appeal to [employer] for a cost savings plan

## 2024-01-10 NOTE — TELEPHONE ENCOUNTER
Bernice from [facility] Ins Provider [phone] stated that the pt wants to appeal her denial for Tobramycin. For further information please call above number or provider # only [phone] to check status.

## 2024-02-08 ENCOUNTER — TELEPHONE (OUTPATIENT)
Age: 29
End: 2024-02-08

## 2024-02-08 NOTE — TELEPHONE ENCOUNTER
Palmira from [facility] called to f/u on status of pt refill request for Renzo and the PA requests. Please advise [phone], you can speak w/ any rep.

## 2024-02-08 NOTE — TELEPHONE ENCOUNTER
Advised [facility] nothing has changed since the last conversation 1/10/24.  Generic is covered but patient wanted name brand due to her having help with free med through the company that makes Renzo. Her insurance denied the Renzo PA.

## 2024-02-12 RX ORDER — SODIUM CHLORIDE FOR INHALATION 7 %
VIAL, NEBULIZER (ML) INHALATION
Qty: 480 ML | Refills: 2 | Status: SHIPPED | OUTPATIENT
Start: 2024-02-12

## 2024-04-17 ENCOUNTER — OFFICE VISIT (OUTPATIENT)
Age: 29
End: 2024-04-17
Payer: COMMERCIAL

## 2024-04-17 VITALS
OXYGEN SATURATION: 100 % | HEART RATE: 111 BPM | RESPIRATION RATE: 16 BRPM | SYSTOLIC BLOOD PRESSURE: 125 MMHG | DIASTOLIC BLOOD PRESSURE: 86 MMHG | HEIGHT: 60 IN | BODY MASS INDEX: 28.11 KG/M2 | TEMPERATURE: 97.3 F | WEIGHT: 143.2 LBS

## 2024-04-17 DIAGNOSIS — R06.09 DYSPNEA ON EXERTION: ICD-10-CM

## 2024-04-17 DIAGNOSIS — J47.9 BRONCHIECTASIS, UNCOMPLICATED (HCC): Primary | ICD-10-CM

## 2024-04-17 DIAGNOSIS — Z22.39 PSEUDOMONAS AERUGINOSA COLONIZATION: ICD-10-CM

## 2024-04-17 PROCEDURE — 99214 OFFICE O/P EST MOD 30 MIN: CPT | Performed by: INTERNAL MEDICINE

## 2024-04-17 NOTE — PROGRESS NOTES
Bonny MADERA Erwin presents today for   Chief Complaint   Patient presents with    Follow-up       Is someone accompanying this pt? No    Is the patient using any DME equipment during OV? No    -DME Company NA    Depression Screenin/22/2023    11:53 AM   PHQ-9 Questionaire   Little interest or pleasure in doing things 0   Feeling down, depressed, or hopeless 0   PHQ-9 Total Score 0       Learning Needs Questionnaire:     No question data found.      Fall Risk:          No data to display                 Abuse Screenin/22/2023    12:00 PM   AMB Abuse Screening   Do you ever feel afraid of your partner? N   Are you in a relationship with someone who physically or mentally threatens you? N   Is it safe for you to go home? Y         Coordination of Care:    1. Have you been to the ER, urgent care clinic since your last visit? Hospitalized since your last visit? No    2. Have you seen or consulted any other health care providers outside of the [facility] since your last visit? Include any pap smears or colon screening. No    Medication list has been update per patient.        
Bonny Martínezen presents today for   Chief Complaint   Patient presents with    Follow-up    Asthma    Fatigue       Is someone accompanying this pt? No    Is the patient using any DME equipment during OV? No    -DME Company NA    Depression Screenin/22/2023    11:53 AM   PHQ-9 Questionaire   Little interest or pleasure in doing things 0   Feeling down, depressed, or hopeless 0   PHQ-9 Total Score 0       Learning Needs Questionnaire:     No question data found.      Fall Risk:          No data to display                 Abuse Screenin/22/2023    12:00 PM   AMB Abuse Screening   Do you ever feel afraid of your partner? N   Are you in a relationship with someone who physically or mentally threatens you? N   Is it safe for you to go home? Y         Coordination of Care:    1. Have you been to the ER, urgent care clinic since your last visit? Hospitalized since your last visit? No    2. Have you seen or consulted any other health care providers outside of the [facility] since your last visit? Include any pap smears or colon screening. No    Medication list has been update per patient.        
(5')   Wt 65 kg (143 lb 3.2 oz)   SpO2 100%   BMI 27.97 kg/m²    Vitals were personally reviewed  Gen: no acute distress, pleasant and cooperative, sitting up in chair, ambulates without difficulty  HEENT: normocephalic/atraumatic, no ocular drainage, EOMI, no scleral icterus, nasal bridge midline, no nasal drainage, good dentition, no oral lesions  CVS: regular rate rhythm, S1/S2, no murmurs/rubs/gallops  Lungs: fair air entry B/L, scattered faint rhonchi (RUL/CHRISTIE), no wheezes/rales/rhonchi  Psych: normal memory, thought content, and processing    Labs:  I have reviewed the patient's available labs  --  Lab Results   Component Value Date    WBC 7.6 10/12/2022    HGB 12.4 10/12/2022    HCT 36.9 10/12/2022    MCV 85 10/12/2022     10/12/2022   Peripheral eosinophil count 200  Lab Results   Component Value Date     10/12/2022    K 3.9 10/12/2022     10/12/2022    CO2 22 10/12/2022    BUN 14 10/12/2022    CREATININE 0.80 10/12/2022    GLUCOSE 94 10/12/2022    CALCIUM 9.3 10/12/2022    PROT 6.7 10/12/2022    BILITOT <0.2 10/12/2022    ALKPHOS 43 (L) 10/12/2022    AST 15 10/12/2022    ALT 10 10/12/2022    LABGLOM 104 10/12/2022    AGRATIO 1.4 10/12/2022   ANCA panel negative    Imaging:  I have personally reviewed patient's imaging -- No new imaging in the interval   **CT chest done with MRI CT diagnostics on 8/25/2022 reviewed on disc, shows clear lung fields bilaterally without infiltrate.  In bases, patient has bronchiectasis seen in lower lobes, left greater than right with a few scattered groundglass opacities.   No masses, consolidations, effusions were seen.  Official report was scanned into [facility]     PFTs: 7/24/2019--spirometry is normal, but shows a reduced FEV1 at 75%.  No BD response.     TTE:  I have reviewed the patient's TTE results   09/07/22      ECHO ADULT COMPLETE 09/07/2022 9/7/2022      Interpretation Summary   Formatting of this result is different from the original.

## 2024-04-29 NOTE — TELEPHONE ENCOUNTER
----- Message from Bonny Hood sent at 4/26/2024 10:19 AM EDT -----  Regarding: Refill  Contact: [phone]  Hi there, can I please get a refill of the acyclovir? It’s not letting me submit a refill request and I need a refill of it    Thanks

## 2024-04-30 RX ORDER — ACYCLOVIR 400 MG/1
TABLET ORAL
Qty: 21 TABLET | Refills: 1 | Status: SHIPPED | OUTPATIENT
Start: 2024-04-30

## 2024-07-03 RX ORDER — PROCHLORPERAZINE MALEATE 5 MG/1
5 TABLET ORAL 2 TIMES DAILY PRN
Qty: 10 TABLET | Refills: 3 | Status: SHIPPED | OUTPATIENT
Start: 2024-07-03

## 2024-10-02 ENCOUNTER — OFFICE VISIT (OUTPATIENT)
Facility: CLINIC | Age: 29
End: 2024-10-02
Payer: COMMERCIAL

## 2024-10-02 VITALS
DIASTOLIC BLOOD PRESSURE: 60 MMHG | OXYGEN SATURATION: 97 % | BODY MASS INDEX: 26.78 KG/M2 | RESPIRATION RATE: 16 BRPM | HEIGHT: 60 IN | WEIGHT: 136.4 LBS | HEART RATE: 70 BPM | SYSTOLIC BLOOD PRESSURE: 110 MMHG | TEMPERATURE: 97 F

## 2024-10-02 DIAGNOSIS — R41.840 ATTENTION OR CONCENTRATION DEFICIT: ICD-10-CM

## 2024-10-02 DIAGNOSIS — Z00.00 ENCOUNTER FOR WELL ADULT EXAM WITHOUT ABNORMAL FINDINGS: Primary | ICD-10-CM

## 2024-10-02 DIAGNOSIS — Z87.01 HISTORY OF RECENT PNEUMONIA: ICD-10-CM

## 2024-10-02 DIAGNOSIS — F32.89 OTHER DEPRESSION: ICD-10-CM

## 2024-10-02 PROBLEM — F32.A DEPRESSION: Status: ACTIVE | Noted: 2024-10-02

## 2024-10-02 PROCEDURE — 99395 PREV VISIT EST AGE 18-39: CPT | Performed by: FAMILY MEDICINE

## 2024-10-02 RX ORDER — DEXTROAMPHETAMINE SACCHARATE, AMPHETAMINE ASPARTATE, DEXTROAMPHETAMINE SULFATE AND AMPHETAMINE SULFATE 3.75; 3.75; 3.75; 3.75 MG/1; MG/1; MG/1; MG/1
TABLET ORAL
COMMUNITY

## 2024-10-02 RX ORDER — CLONIDINE HYDROCHLORIDE 0.1 MG/1
TABLET ORAL
COMMUNITY

## 2024-10-02 RX ORDER — BENZONATATE 100 MG/1
CAPSULE ORAL
COMMUNITY

## 2024-10-02 SDOH — ECONOMIC STABILITY: FOOD INSECURITY: WITHIN THE PAST 12 MONTHS, THE FOOD YOU BOUGHT JUST DIDN'T LAST AND YOU DIDN'T HAVE MONEY TO GET MORE.: NEVER TRUE

## 2024-10-02 SDOH — ECONOMIC STABILITY: FOOD INSECURITY: WITHIN THE PAST 12 MONTHS, YOU WORRIED THAT YOUR FOOD WOULD RUN OUT BEFORE YOU GOT MONEY TO BUY MORE.: NEVER TRUE

## 2024-10-02 SDOH — ECONOMIC STABILITY: INCOME INSECURITY: HOW HARD IS IT FOR YOU TO PAY FOR THE VERY BASICS LIKE FOOD, HOUSING, MEDICAL CARE, AND HEATING?: NOT HARD AT ALL

## 2024-10-02 ASSESSMENT — ANXIETY QUESTIONNAIRES
2. NOT BEING ABLE TO STOP OR CONTROL WORRYING: NOT AT ALL
4. TROUBLE RELAXING: NOT AT ALL
IF YOU CHECKED OFF ANY PROBLEMS ON THIS QUESTIONNAIRE, HOW DIFFICULT HAVE THESE PROBLEMS MADE IT FOR YOU TO DO YOUR WORK, TAKE CARE OF THINGS AT HOME, OR GET ALONG WITH OTHER PEOPLE: NOT DIFFICULT AT ALL
5. BEING SO RESTLESS THAT IT IS HARD TO SIT STILL: NOT AT ALL
6. BECOMING EASILY ANNOYED OR IRRITABLE: NOT AT ALL
3. WORRYING TOO MUCH ABOUT DIFFERENT THINGS: NOT AT ALL
1. FEELING NERVOUS, ANXIOUS, OR ON EDGE: NOT AT ALL
GAD7 TOTAL SCORE: 0
7. FEELING AFRAID AS IF SOMETHING AWFUL MIGHT HAPPEN: NOT AT ALL

## 2024-10-02 ASSESSMENT — PATIENT HEALTH QUESTIONNAIRE - PHQ9
SUM OF ALL RESPONSES TO PHQ9 QUESTIONS 1 & 2: 0
SUM OF ALL RESPONSES TO PHQ QUESTIONS 1-9: 0
1. LITTLE INTEREST OR PLEASURE IN DOING THINGS: NOT AT ALL
SUM OF ALL RESPONSES TO PHQ QUESTIONS 1-9: 0
2. FEELING DOWN, DEPRESSED OR HOPELESS: NOT AT ALL

## 2024-10-02 NOTE — PATIENT INSTRUCTIONS
hands, brush your teeth twice a day, and wear a seat belt in the car.   Where can you learn more?  Go to [email or web address] and enter P072 to learn more about \"Well Visit, Ages 18 to 65: Care Instructions.\"  Current as of: August 6, 2023  Content Version: 14.1  © 4258-8318 [employer], [employer].   Care instructions adapted under license by [employer]. If you have questions about a medical condition or this instruction, always ask your healthcare professional. [employer]wise, [employer] disclaims any warranty or liability for your use of this information.

## 2024-10-02 NOTE — PROGRESS NOTES
\"Have you been to the ER, urgent care clinic since your last visit?  Hospitalized since your last visit?\"    [facility] LOV: 9/08/24 for pneumonia & 8/24/24 for viral infection.    “Have you seen or consulted any other health care providers outside our system since your last visit?”    [facility] LOV: 9/11/24 for bronchiectasis with acute lower respiratory infection.     “Have you had a pap smear?”    NO    Date of last Cervical Cancer screen (HPV or PAP): 8/14/2020     Chief Complaint   Patient presents with    Annual Exam              
(TESSALON) 100 MG capsule TAKE 1 CAPSULE BY MOUTH THREE TIMES DAILY AS NEEDED FOR COUGHIING, SWALLOW CAPSULES WHOLE      amphetamine-dextroamphetamine (ADDERALL) 15 MG tablet TAKE 1 TABLET BY MOUTH TWICE DAILY FOR ADHD      amoxicillin-clavulanate (AUGMENTIN) 875-125 MG per tablet Take 1 tablet by mouth in the morning and 1 tablet in the evening.      prochlorperazine (COMPAZINE) 5 MG tablet Take 1 tablet by mouth 2 times daily as needed for Nausea 10 tablet 3    acyclovir (ZOVIRAX) 400 MG tablet Take 1 tablet by mouth 3 times daily as needed 21 tablet 1    sodium chloride, Inhalant, 7 % nebulizer solution USE 1 VIAL WITH ALBUTEROL VIA NEBULIZER FOUR TIMES DAILY 480 mL 2    Sodium Chloride (HYPERSAL) 3.5 % NEBU Use one vial with albuterol neb four times per day.  Dx:47.9 1000 mL 11    albuterol sulfate HFA (PROVENTIL;VENTOLIN;PROAIR) 108 (90 Base) MCG/ACT inhaler Inhale 2 puffs into the lungs every 6 hours as needed for Wheezing or Shortness of Breath      albuterol (PROVENTIL) (2.5 MG/3ML) 0.083% nebulizer solution Take 3 mLs by nebulization 4 times daily      fexofenadine (ALLEGRA) 180 MG tablet Take 1 tablet by mouth daily      ibuprofen (ADVIL;MOTRIN) 200 MG tablet Take 1 tablet by mouth every 6 hours as needed for Pain or Fever      ipratropium (ATROVENT) 0.02 % nebulizer solution Inhale 2.5 mLs into the lungs every 4 hours as needed      LORazepam (ATIVAN) 1 MG tablet Take 1 tablet by mouth as needed.      mupirocin (BACTROBAN) 2 % ointment apply by topical route 1/2 inch to jarett med rinse bottle, rinse bilateral nares BID everyday      ondansetron (ZOFRAN) 4 MG tablet Take 1 tablet by mouth every 8 hours as needed for Nausea or Vomiting      QUEtiapine (SEROQUEL) 50 MG tablet Take 1 tablet by mouth       No current facility-administered medications for this visit.     Allergies   Allergen Reactions    Nitrofurantoin Itching     Past Medical History:   Diagnosis Date    ADD (attention deficit disorder)

## 2025-07-07 RX ORDER — ACYCLOVIR 400 MG/1
400 TABLET ORAL 3 TIMES DAILY PRN
Qty: 21 TABLET | Refills: 1 | Status: SHIPPED | OUTPATIENT
Start: 2025-07-07